# Patient Record
Sex: MALE | Race: WHITE | NOT HISPANIC OR LATINO | URBAN - METROPOLITAN AREA
[De-identification: names, ages, dates, MRNs, and addresses within clinical notes are randomized per-mention and may not be internally consistent; named-entity substitution may affect disease eponyms.]

---

## 2017-05-04 ENCOUNTER — EMERGENCY (EMERGENCY)
Facility: HOSPITAL | Age: 72
LOS: 1 days | Discharge: HOME | End: 2017-05-04
Attending: EMERGENCY MEDICINE | Admitting: EMERGENCY MEDICINE
Payer: MEDICARE

## 2017-05-04 VITALS
RESPIRATION RATE: 20 BRPM | HEART RATE: 87 BPM | DIASTOLIC BLOOD PRESSURE: 68 MMHG | SYSTOLIC BLOOD PRESSURE: 123 MMHG | OXYGEN SATURATION: 98 %

## 2017-05-04 VITALS
WEIGHT: 197.98 LBS | HEIGHT: 74 IN | RESPIRATION RATE: 20 BRPM | HEART RATE: 93 BPM | TEMPERATURE: 98 F | DIASTOLIC BLOOD PRESSURE: 80 MMHG | OXYGEN SATURATION: 98 % | SYSTOLIC BLOOD PRESSURE: 136 MMHG

## 2017-05-04 DIAGNOSIS — R06.02 SHORTNESS OF BREATH: ICD-10-CM

## 2017-05-04 DIAGNOSIS — Z88.8 ALLERGY STATUS TO OTHER DRUGS, MEDICAMENTS AND BIOLOGICAL SUBSTANCES STATUS: ICD-10-CM

## 2017-05-04 DIAGNOSIS — R91.8 OTHER NONSPECIFIC ABNORMAL FINDING OF LUNG FIELD: ICD-10-CM

## 2017-05-04 DIAGNOSIS — Z79.899 OTHER LONG TERM (CURRENT) DRUG THERAPY: ICD-10-CM

## 2017-05-04 DIAGNOSIS — I48.92 UNSPECIFIED ATRIAL FLUTTER: ICD-10-CM

## 2017-05-04 DIAGNOSIS — Z88.0 ALLERGY STATUS TO PENICILLIN: ICD-10-CM

## 2017-05-04 LAB
ANION GAP SERPL CALC-SCNC: 15 MMOL/L — SIGNIFICANT CHANGE UP (ref 5–17)
ANISOCYTOSIS BLD QL: SLIGHT — SIGNIFICANT CHANGE UP
APTT BLD: 29.3 SEC — SIGNIFICANT CHANGE UP (ref 27.5–37.4)
BASOPHILS # BLD AUTO: 0 K/UL — SIGNIFICANT CHANGE UP (ref 0–0.2)
BASOPHILS NFR BLD AUTO: 2 % — SIGNIFICANT CHANGE UP (ref 0–2)
BUN SERPL-MCNC: 20 MG/DL — SIGNIFICANT CHANGE UP (ref 8–20)
CALCIUM SERPL-MCNC: 8.8 MG/DL — SIGNIFICANT CHANGE UP (ref 8.6–10.2)
CHLORIDE SERPL-SCNC: 102 MMOL/L — SIGNIFICANT CHANGE UP (ref 98–107)
CO2 SERPL-SCNC: 21 MMOL/L — LOW (ref 22–29)
CREAT SERPL-MCNC: 0.73 MG/DL — SIGNIFICANT CHANGE UP (ref 0.5–1.3)
EOSINOPHIL # BLD AUTO: 0 K/UL — SIGNIFICANT CHANGE UP (ref 0–0.5)
EOSINOPHIL NFR BLD AUTO: 0 % — SIGNIFICANT CHANGE UP (ref 0–5)
GLUCOSE SERPL-MCNC: 87 MG/DL — SIGNIFICANT CHANGE UP (ref 70–115)
HCT VFR BLD CALC: 29.5 % — LOW (ref 42–52)
HGB BLD-MCNC: 10 G/DL — LOW (ref 14–18)
INR BLD: 1.35 RATIO — HIGH (ref 0.88–1.16)
LYMPHOCYTES # BLD AUTO: 0 % — LOW (ref 20–55)
LYMPHOCYTES # BLD AUTO: 0.4 K/UL — LOW (ref 1–4.8)
MACROCYTES BLD QL: SLIGHT — SIGNIFICANT CHANGE UP
MCHC RBC-ENTMCNC: 33.9 G/DL — SIGNIFICANT CHANGE UP (ref 32–36)
MCHC RBC-ENTMCNC: 36.6 PG — HIGH (ref 27–31)
MCV RBC AUTO: 108.1 FL — HIGH (ref 80–94)
METAMYELOCYTES # FLD: 1 % — HIGH (ref 0–0)
MONOCYTES # BLD AUTO: 0.6 K/UL — SIGNIFICANT CHANGE UP (ref 0–0.8)
MONOCYTES NFR BLD AUTO: 20 % — HIGH (ref 3–10)
NEUTROPHILS # BLD AUTO: 2.8 K/UL — SIGNIFICANT CHANGE UP (ref 1.8–8)
NEUTROPHILS NFR BLD AUTO: 71 % — SIGNIFICANT CHANGE UP (ref 37–73)
NEUTS BAND # BLD: 5 % — SIGNIFICANT CHANGE UP (ref 0–8)
NT-PROBNP SERPL-SCNC: 1844 PG/ML — HIGH (ref 0–300)
PLAT MORPH BLD: NORMAL — SIGNIFICANT CHANGE UP
PLATELET # BLD AUTO: 226 K/UL — SIGNIFICANT CHANGE UP (ref 150–400)
PLATELET CLUMP BLD QL SMEAR: SIGNIFICANT CHANGE UP
PLATELET COUNT - ESTIMATE: NORMAL — SIGNIFICANT CHANGE UP
POTASSIUM SERPL-MCNC: 4.5 MMOL/L — SIGNIFICANT CHANGE UP (ref 3.5–5.3)
POTASSIUM SERPL-SCNC: 4.5 MMOL/L — SIGNIFICANT CHANGE UP (ref 3.5–5.3)
PROTHROM AB SERPL-ACNC: 14.9 SEC — HIGH (ref 9.8–12.7)
RBC # BLD: 2.73 M/UL — LOW (ref 4.6–6.2)
RBC # FLD: 15.5 % — SIGNIFICANT CHANGE UP (ref 11–15.6)
RBC BLD AUTO: ABNORMAL
SODIUM SERPL-SCNC: 138 MMOL/L — SIGNIFICANT CHANGE UP (ref 135–145)
TROPONIN T SERPL-MCNC: <0.01 NG/ML — SIGNIFICANT CHANGE UP (ref 0–0.06)
VARIANT LYMPHS # BLD: 1 % — SIGNIFICANT CHANGE UP (ref 0–6)
WBC # BLD: 3.8 K/UL — LOW (ref 4.8–10.8)
WBC # FLD AUTO: 3.8 K/UL — LOW (ref 4.8–10.8)

## 2017-05-04 PROCEDURE — 84484 ASSAY OF TROPONIN QUANT: CPT

## 2017-05-04 PROCEDURE — 83880 ASSAY OF NATRIURETIC PEPTIDE: CPT

## 2017-05-04 PROCEDURE — 71275 CT ANGIOGRAPHY CHEST: CPT

## 2017-05-04 PROCEDURE — 85027 COMPLETE CBC AUTOMATED: CPT

## 2017-05-04 PROCEDURE — 93010 ELECTROCARDIOGRAM REPORT: CPT

## 2017-05-04 PROCEDURE — 71275 CT ANGIOGRAPHY CHEST: CPT | Mod: 26

## 2017-05-04 PROCEDURE — 71010: CPT | Mod: 26

## 2017-05-04 PROCEDURE — 71045 X-RAY EXAM CHEST 1 VIEW: CPT

## 2017-05-04 PROCEDURE — 93005 ELECTROCARDIOGRAM TRACING: CPT

## 2017-05-04 PROCEDURE — 85610 PROTHROMBIN TIME: CPT

## 2017-05-04 PROCEDURE — 99284 EMERGENCY DEPT VISIT MOD MDM: CPT

## 2017-05-04 PROCEDURE — 99284 EMERGENCY DEPT VISIT MOD MDM: CPT | Mod: 25

## 2017-05-04 PROCEDURE — 85730 THROMBOPLASTIN TIME PARTIAL: CPT

## 2017-05-04 PROCEDURE — 80048 BASIC METABOLIC PNL TOTAL CA: CPT

## 2017-05-04 RX ORDER — AZITHROMYCIN 500 MG/1
1 TABLET, FILM COATED ORAL
Qty: 5 | Refills: 0 | OUTPATIENT
Start: 2017-05-04 | End: 2017-05-09

## 2017-05-04 RX ORDER — AZITHROMYCIN 500 MG/1
500 TABLET, FILM COATED ORAL ONCE
Qty: 0 | Refills: 0 | Status: COMPLETED | OUTPATIENT
Start: 2017-05-04 | End: 2017-05-04

## 2017-05-04 RX ADMIN — AZITHROMYCIN 500 MILLIGRAM(S): 500 TABLET, FILM COATED ORAL at 21:49

## 2017-05-04 NOTE — ED ADULT NURSE REASSESSMENT NOTE - NS ED NURSE REASSESS COMMENT FT1
pt resting comfortably, resp even unlabored. pending CT.
resp even unlabored, pending offical CT results. will continue to monitor

## 2017-05-04 NOTE — ED ADULT NURSE NOTE - OBJECTIVE STATEMENT
received pt AOx3 c/o SOB x 1 week, worse over the past couple days, has B cell lymphoma and being treated with chemo, last dose was 2 weeks ago. resp even unlabored, does not wear 02 at home but states he is more SOB when walking around. denies fevers, chills, hemoptysis. pt is on Xarelto for a flutter. denies cp. MAEx4, neuro intact will continue to monitor

## 2017-05-06 NOTE — ED PROVIDER NOTE - MEDICAL DECISION MAKING DETAILS
abx in nad DDX considered: stable angina, aortic dissection, pericarditis, pneumonia, pe, pts, chest wall pain, esophageal spasm and abdominal emergencies. I have discussed with patient with negative troponin and normal ekg their 28 day cardiac risk and they have agreed to outpt cardio f/ut his week without fail. asa per day until then. return to the ed immediately for any intractable chest pain or sob. pt agrees to plan of care   spoke with dr self agrees with plan of care f.u [facility] within 48 hrs without fail   return to ed for intractbale chest pain or sob pt agrees to plan of care

## 2017-05-06 NOTE — ED PROVIDER NOTE - OBJECTIVE STATEMENT
pt presents with vidal and non productive cough. on eliquis aflutter. no hemoptysis no leg swellign. denies fever. denies HA or neck pain. no chest pain + sob. no abd pain. no n/v/d. no urinary f/u/d. no back pain. no motor or sensory deficits. denies drug use. no recent travel. no rash. no other acute issues symptoms or concerns

## 2017-05-06 NOTE — ED PROVIDER NOTE - CARE PLAN
Principal Discharge DX:	Dyspnea  Secondary Diagnosis:	Atrial flutter  Secondary Diagnosis:	Pulmonary infiltrate

## 2017-08-22 ENCOUNTER — EMERGENCY (EMERGENCY)
Facility: HOSPITAL | Age: 72
LOS: 1 days | Discharge: HOME | End: 2017-08-22
Attending: EMERGENCY MEDICINE
Payer: MEDICARE

## 2017-08-22 VITALS
DIASTOLIC BLOOD PRESSURE: 74 MMHG | SYSTOLIC BLOOD PRESSURE: 115 MMHG | TEMPERATURE: 98 F | OXYGEN SATURATION: 97 % | RESPIRATION RATE: 16 BRPM

## 2017-08-22 VITALS — HEIGHT: 74 IN | WEIGHT: 203.05 LBS

## 2017-08-22 LAB
ALBUMIN SERPL ELPH-MCNC: 3.7 G/DL — SIGNIFICANT CHANGE UP (ref 3.3–5.2)
ALP SERPL-CCNC: 105 U/L — SIGNIFICANT CHANGE UP (ref 40–120)
ALT FLD-CCNC: 25 U/L — SIGNIFICANT CHANGE UP
ANION GAP SERPL CALC-SCNC: 14 MMOL/L — SIGNIFICANT CHANGE UP (ref 5–17)
ANISOCYTOSIS BLD QL: SLIGHT — SIGNIFICANT CHANGE UP
AST SERPL-CCNC: 22 U/L — SIGNIFICANT CHANGE UP
BASOPHILS # BLD AUTO: 0 K/UL — SIGNIFICANT CHANGE UP (ref 0–0.2)
BASOPHILS NFR BLD AUTO: 0 % — SIGNIFICANT CHANGE UP (ref 0–2)
BILIRUB SERPL-MCNC: 0.6 MG/DL — SIGNIFICANT CHANGE UP (ref 0.4–2)
BUN SERPL-MCNC: 15 MG/DL — SIGNIFICANT CHANGE UP (ref 8–20)
CALCIUM SERPL-MCNC: 8.9 MG/DL — SIGNIFICANT CHANGE UP (ref 8.6–10.2)
CHLORIDE SERPL-SCNC: 99 MMOL/L — SIGNIFICANT CHANGE UP (ref 98–107)
CO2 SERPL-SCNC: 24 MMOL/L — SIGNIFICANT CHANGE UP (ref 22–29)
CREAT SERPL-MCNC: 0.83 MG/DL — SIGNIFICANT CHANGE UP (ref 0.5–1.3)
EOSINOPHIL # BLD AUTO: 0 K/UL — SIGNIFICANT CHANGE UP (ref 0–0.5)
EOSINOPHIL NFR BLD AUTO: 0 % — SIGNIFICANT CHANGE UP (ref 0–5)
GLUCOSE SERPL-MCNC: 114 MG/DL — SIGNIFICANT CHANGE UP (ref 70–115)
HCT VFR BLD CALC: 35.3 % — LOW (ref 42–52)
HGB BLD-MCNC: 11.5 G/DL — LOW (ref 14–18)
LACTATE BLDV-MCNC: 1.1 MMOL/L — SIGNIFICANT CHANGE UP (ref 0.5–2)
LYMPHOCYTES # BLD AUTO: 0.4 K/UL — LOW (ref 1–4.8)
LYMPHOCYTES # BLD AUTO: 2 % — LOW (ref 20–55)
MCHC RBC-ENTMCNC: 32.6 G/DL — SIGNIFICANT CHANGE UP (ref 32–36)
MCHC RBC-ENTMCNC: 34.3 PG — HIGH (ref 27–31)
MCV RBC AUTO: 105.4 FL — HIGH (ref 80–94)
METAMYELOCYTES # FLD: 3 % — HIGH (ref 0–0)
MONOCYTES # BLD AUTO: 0.6 K/UL — SIGNIFICANT CHANGE UP (ref 0–0.8)
MONOCYTES NFR BLD AUTO: 14 % — HIGH (ref 3–10)
MYELOCYTES NFR BLD: 1 % — HIGH (ref 0–0)
NEUTROPHILS # BLD AUTO: 6.3 K/UL — SIGNIFICANT CHANGE UP (ref 1.8–8)
NEUTROPHILS NFR BLD AUTO: 72 % — SIGNIFICANT CHANGE UP (ref 37–73)
NEUTS BAND # BLD: 6 % — SIGNIFICANT CHANGE UP (ref 0–8)
PLAT MORPH BLD: NORMAL — SIGNIFICANT CHANGE UP
PLATELET # BLD AUTO: 139 K/UL — LOW (ref 150–400)
POTASSIUM SERPL-MCNC: 4.6 MMOL/L — SIGNIFICANT CHANGE UP (ref 3.5–5.3)
POTASSIUM SERPL-SCNC: 4.6 MMOL/L — SIGNIFICANT CHANGE UP (ref 3.5–5.3)
PROT SERPL-MCNC: 6.3 G/DL — LOW (ref 6.6–8.7)
RBC # BLD: 3.35 M/UL — LOW (ref 4.6–6.2)
RBC # FLD: 14.5 % — SIGNIFICANT CHANGE UP (ref 11–15.6)
RBC BLD AUTO: SIGNIFICANT CHANGE UP
SODIUM SERPL-SCNC: 137 MMOL/L — SIGNIFICANT CHANGE UP (ref 135–145)
VARIANT LYMPHS # BLD: 2 % — SIGNIFICANT CHANGE UP (ref 0–6)
WBC # BLD: 8.3 K/UL — SIGNIFICANT CHANGE UP (ref 4.8–10.8)
WBC # FLD AUTO: 8.3 K/UL — SIGNIFICANT CHANGE UP (ref 4.8–10.8)

## 2017-08-22 PROCEDURE — 80053 COMPREHEN METABOLIC PANEL: CPT

## 2017-08-22 PROCEDURE — 99284 EMERGENCY DEPT VISIT MOD MDM: CPT | Mod: 25

## 2017-08-22 PROCEDURE — 36415 COLL VENOUS BLD VENIPUNCTURE: CPT

## 2017-08-22 PROCEDURE — 85027 COMPLETE CBC AUTOMATED: CPT

## 2017-08-22 PROCEDURE — 93971 EXTREMITY STUDY: CPT

## 2017-08-22 PROCEDURE — 10061 I&D ABSCESS COMP/MULTIPLE: CPT

## 2017-08-22 PROCEDURE — 73080 X-RAY EXAM OF ELBOW: CPT

## 2017-08-22 PROCEDURE — 87040 BLOOD CULTURE FOR BACTERIA: CPT

## 2017-08-22 PROCEDURE — 83605 ASSAY OF LACTIC ACID: CPT

## 2017-08-22 PROCEDURE — 93971 EXTREMITY STUDY: CPT | Mod: 26,LT

## 2017-08-22 PROCEDURE — 96374 THER/PROPH/DIAG INJ IV PUSH: CPT | Mod: XU

## 2017-08-22 PROCEDURE — 90471 IMMUNIZATION ADMIN: CPT

## 2017-08-22 PROCEDURE — 90715 TDAP VACCINE 7 YRS/> IM: CPT

## 2017-08-22 PROCEDURE — 73080 X-RAY EXAM OF ELBOW: CPT | Mod: 26,LT

## 2017-08-22 RX ORDER — CEFTRIAXONE 500 MG/1
1 INJECTION, POWDER, FOR SOLUTION INTRAMUSCULAR; INTRAVENOUS ONCE
Qty: 0 | Refills: 0 | Status: COMPLETED | OUTPATIENT
Start: 2017-08-22 | End: 2017-08-22

## 2017-08-22 RX ORDER — LIDOCAINE HCL 20 MG/ML
5 VIAL (ML) INJECTION ONCE
Qty: 0 | Refills: 0 | Status: COMPLETED | OUTPATIENT
Start: 2017-08-22 | End: 2017-08-22

## 2017-08-22 RX ORDER — TETANUS TOXOID, REDUCED DIPHTHERIA TOXOID AND ACELLULAR PERTUSSIS VACCINE, ADSORBED 5; 2.5; 8; 8; 2.5 [IU]/.5ML; [IU]/.5ML; UG/.5ML; UG/.5ML; UG/.5ML
0.5 SUSPENSION INTRAMUSCULAR ONCE
Qty: 0 | Refills: 0 | Status: COMPLETED | OUTPATIENT
Start: 2017-08-22 | End: 2017-08-22

## 2017-08-22 RX ADMIN — TETANUS TOXOID, REDUCED DIPHTHERIA TOXOID AND ACELLULAR PERTUSSIS VACCINE, ADSORBED 0.5 MILLILITER(S): 5; 2.5; 8; 8; 2.5 SUSPENSION INTRAMUSCULAR at 17:59

## 2017-08-22 RX ADMIN — Medication 5 MILLILITER(S): at 16:05

## 2017-08-22 RX ADMIN — CEFTRIAXONE 100 GRAM(S): 500 INJECTION, POWDER, FOR SOLUTION INTRAMUSCULAR; INTRAVENOUS at 16:05

## 2017-08-22 NOTE — ED PROVIDER NOTE - PROGRESS NOTE DETAILS
after I and D pt given iv rocephin and wbc nl he says it feekls much better and he will f/u with pmd or return here in 2 days for wound check and remove packing

## 2017-08-22 NOTE — ED STATDOCS - PROGRESS NOTE DETAILS
73 y/o M pt  with hx of lymphoma (on imbruvica), HTN, loop recorder ,cardiac ablation ( 2months ago) presents to ED c/o left arm/elbow erythema, swelling  for 9 days. Pt started martín ing Augmentin, symptoms worsening. Was seen by doctor and given an injection of penicillin. He states abscess and arm isn't getting better but not getting worse.     PE: Marked swelling left forearm, pitting edema left hand, induration and swelling medial aspect left elbow, fluctuant swelling over olecranon process, axillary lymphadenopathy

## 2017-08-22 NOTE — ED PROVIDER NOTE - OBJECTIVE STATEMENT
71 y/o male states he has a h/o lymphoma and is on Imbruvica and he developed an abscess on his left elbow and 73 y/o male states he has a h/o lymphoma and is on Imbruvica and he was well until about 2 weeks ago developed an abscess on his left elbow and he saw his PMD and treated with a shot of penicillin and then 4 days ago he was put on Augmentin 875 bid and he has been taking it but he noted arm swelling so came to ed 71 y/o male states he has a h/o lymphoma and is on Imbruvica and he was well until about 2 weeks ago developed an abscess on his left elbow and he saw his PMD and treated with a shot of penicillin and then 4 days ago he was put on Augmentin 875 bid and he has been taking it but he noted arm swelling so came to ed TDap not up to date and R and B discussed and pt agrees to it

## 2017-08-22 NOTE — ED PROVIDER NOTE - SKIN, MLM
Skin normal color for race, warm, dry and intact. No evidence of rash.abscess proximal forearm with surrounding edema and faint patially treated cellultis up to elbow proximal arm not involved and no lymphangitis axilla nontender no axillary lymadenopathy

## 2017-08-27 LAB
CULTURE RESULTS: SIGNIFICANT CHANGE UP
CULTURE RESULTS: SIGNIFICANT CHANGE UP
SPECIMEN SOURCE: SIGNIFICANT CHANGE UP
SPECIMEN SOURCE: SIGNIFICANT CHANGE UP

## 2018-03-06 ENCOUNTER — RX RENEWAL (OUTPATIENT)
Age: 73
End: 2018-03-06

## 2018-03-12 ENCOUNTER — APPOINTMENT (OUTPATIENT)
Dept: NEUROLOGY | Facility: CLINIC | Age: 73
End: 2018-03-12
Payer: MEDICARE

## 2018-03-12 VITALS
HEIGHT: 69 IN | DIASTOLIC BLOOD PRESSURE: 72 MMHG | BODY MASS INDEX: 22.22 KG/M2 | SYSTOLIC BLOOD PRESSURE: 130 MMHG | WEIGHT: 150 LBS

## 2018-03-12 DIAGNOSIS — G62.0 DRUG-INDUCED POLYNEUROPATHY: ICD-10-CM

## 2018-03-12 DIAGNOSIS — T45.1X5A DRUG-INDUCED POLYNEUROPATHY: ICD-10-CM

## 2018-03-12 DIAGNOSIS — Z86.79 PERSONAL HISTORY OF OTHER DISEASES OF THE CIRCULATORY SYSTEM: ICD-10-CM

## 2018-03-12 DIAGNOSIS — Z78.9 OTHER SPECIFIED HEALTH STATUS: ICD-10-CM

## 2018-03-12 DIAGNOSIS — C95.91 LEUKEMIA, UNSPECIFIED, IN REMISSION: ICD-10-CM

## 2018-03-12 DIAGNOSIS — Z87.891 PERSONAL HISTORY OF NICOTINE DEPENDENCE: ICD-10-CM

## 2018-03-12 PROCEDURE — 99213 OFFICE O/P EST LOW 20 MIN: CPT

## 2018-04-06 ENCOUNTER — TRANSCRIPTION ENCOUNTER (OUTPATIENT)
Age: 73
End: 2018-04-06

## 2018-08-27 ENCOUNTER — RX RENEWAL (OUTPATIENT)
Age: 73
End: 2018-08-27

## 2018-08-27 RX ORDER — GABAPENTIN 300 MG/1
300 CAPSULE ORAL 3 TIMES DAILY
Qty: 90 | Refills: 5 | Status: ACTIVE | COMMUNITY
Start: 2018-03-12 | End: 1900-01-01

## 2019-02-28 ENCOUNTER — EMERGENCY (EMERGENCY)
Facility: HOSPITAL | Age: 74
LOS: 1 days | Discharge: HOME | End: 2019-02-28
Attending: EMERGENCY MEDICINE
Payer: MEDICARE

## 2019-02-28 VITALS
HEART RATE: 98 BPM | OXYGEN SATURATION: 99 % | SYSTOLIC BLOOD PRESSURE: 138 MMHG | DIASTOLIC BLOOD PRESSURE: 87 MMHG | TEMPERATURE: 98 F | RESPIRATION RATE: 20 BRPM

## 2019-02-28 VITALS — HEIGHT: 73 IN | WEIGHT: 195.11 LBS

## 2019-02-28 LAB
ALBUMIN SERPL ELPH-MCNC: 4.1 G/DL — SIGNIFICANT CHANGE UP (ref 3.3–5.2)
ALP SERPL-CCNC: 79 U/L — SIGNIFICANT CHANGE UP (ref 40–120)
ALT FLD-CCNC: 7 U/L — SIGNIFICANT CHANGE UP
ANION GAP SERPL CALC-SCNC: 11 MMOL/L — SIGNIFICANT CHANGE UP (ref 5–17)
APTT BLD: 30.7 SEC — SIGNIFICANT CHANGE UP (ref 27.5–36.3)
AST SERPL-CCNC: 16 U/L — SIGNIFICANT CHANGE UP
BASOPHILS # BLD AUTO: 0.1 K/UL — SIGNIFICANT CHANGE UP (ref 0–0.2)
BASOPHILS NFR BLD AUTO: 1 % — SIGNIFICANT CHANGE UP (ref 0–2)
BILIRUB SERPL-MCNC: 0.7 MG/DL — SIGNIFICANT CHANGE UP (ref 0.4–2)
BUN SERPL-MCNC: 23 MG/DL — HIGH (ref 8–20)
CALCIUM SERPL-MCNC: 8.4 MG/DL — LOW (ref 8.6–10.2)
CHLORIDE SERPL-SCNC: 105 MMOL/L — SIGNIFICANT CHANGE UP (ref 98–107)
CK SERPL-CCNC: 50 U/L — SIGNIFICANT CHANGE UP (ref 30–200)
CO2 SERPL-SCNC: 25 MMOL/L — SIGNIFICANT CHANGE UP (ref 22–29)
CREAT SERPL-MCNC: 1.04 MG/DL — SIGNIFICANT CHANGE UP (ref 0.5–1.3)
EOSINOPHIL # BLD AUTO: 0 K/UL — SIGNIFICANT CHANGE UP (ref 0–0.5)
GLUCOSE SERPL-MCNC: 122 MG/DL — HIGH (ref 70–115)
HCT VFR BLD CALC: 31.2 % — LOW (ref 42–52)
HGB BLD-MCNC: 10.4 G/DL — LOW (ref 14–18)
HYPOCHROMIA BLD QL: SLIGHT — SIGNIFICANT CHANGE UP
INR BLD: 1.39 RATIO — HIGH (ref 0.88–1.16)
LYMPHOCYTES # BLD AUTO: 0.7 K/UL — LOW (ref 1–4.8)
LYMPHOCYTES # BLD AUTO: 12 % — LOW (ref 20–55)
MACROCYTES BLD QL: SLIGHT — SIGNIFICANT CHANGE UP
MCHC RBC-ENTMCNC: 33.3 G/DL — SIGNIFICANT CHANGE UP (ref 32–36)
MCHC RBC-ENTMCNC: 36.9 PG — HIGH (ref 27–31)
MCV RBC AUTO: 110.6 FL — HIGH (ref 80–94)
MONOCYTES # BLD AUTO: 0.6 K/UL — SIGNIFICANT CHANGE UP (ref 0–0.8)
MONOCYTES NFR BLD AUTO: 11 % — HIGH (ref 3–10)
MYELOCYTES NFR BLD: 2 % — HIGH (ref 0–0)
NEUTROPHILS # BLD AUTO: 4.3 K/UL — SIGNIFICANT CHANGE UP (ref 1.8–8)
NEUTROPHILS NFR BLD AUTO: 68 % — SIGNIFICANT CHANGE UP (ref 37–73)
NEUTS BAND # BLD: 6 % — SIGNIFICANT CHANGE UP (ref 0–8)
NT-PROBNP SERPL-SCNC: 190 PG/ML — SIGNIFICANT CHANGE UP (ref 0–300)
PLAT MORPH BLD: NORMAL — SIGNIFICANT CHANGE UP
PLATELET # BLD AUTO: 131 K/UL — LOW (ref 150–400)
POTASSIUM SERPL-MCNC: 4.7 MMOL/L — SIGNIFICANT CHANGE UP (ref 3.5–5.3)
POTASSIUM SERPL-SCNC: 4.7 MMOL/L — SIGNIFICANT CHANGE UP (ref 3.5–5.3)
PROT SERPL-MCNC: 6.1 G/DL — LOW (ref 6.6–8.7)
PROTHROM AB SERPL-ACNC: 16.1 SEC — HIGH (ref 10–12.9)
RBC # BLD: 2.82 M/UL — LOW (ref 4.6–6.2)
RBC # FLD: 13.8 % — SIGNIFICANT CHANGE UP (ref 11–15.6)
RBC BLD AUTO: ABNORMAL
SODIUM SERPL-SCNC: 141 MMOL/L — SIGNIFICANT CHANGE UP (ref 135–145)
TROPONIN T SERPL-MCNC: <0.01 NG/ML — SIGNIFICANT CHANGE UP (ref 0–0.06)
WBC # BLD: 5.7 K/UL — SIGNIFICANT CHANGE UP (ref 4.8–10.8)
WBC # FLD AUTO: 5.7 K/UL — SIGNIFICANT CHANGE UP (ref 4.8–10.8)

## 2019-02-28 PROCEDURE — 85730 THROMBOPLASTIN TIME PARTIAL: CPT

## 2019-02-28 PROCEDURE — 36415 COLL VENOUS BLD VENIPUNCTURE: CPT

## 2019-02-28 PROCEDURE — 85610 PROTHROMBIN TIME: CPT

## 2019-02-28 PROCEDURE — 83880 ASSAY OF NATRIURETIC PEPTIDE: CPT

## 2019-02-28 PROCEDURE — 71045 X-RAY EXAM CHEST 1 VIEW: CPT

## 2019-02-28 PROCEDURE — 82550 ASSAY OF CK (CPK): CPT

## 2019-02-28 PROCEDURE — 99284 EMERGENCY DEPT VISIT MOD MDM: CPT

## 2019-02-28 PROCEDURE — 99284 EMERGENCY DEPT VISIT MOD MDM: CPT | Mod: 25

## 2019-02-28 PROCEDURE — 71045 X-RAY EXAM CHEST 1 VIEW: CPT | Mod: 26

## 2019-02-28 PROCEDURE — 73702 CT LWR EXTREMITY W/O&W/DYE: CPT

## 2019-02-28 PROCEDURE — 71275 CT ANGIOGRAPHY CHEST: CPT | Mod: 26

## 2019-02-28 PROCEDURE — 93005 ELECTROCARDIOGRAM TRACING: CPT

## 2019-02-28 PROCEDURE — 93971 EXTREMITY STUDY: CPT

## 2019-02-28 PROCEDURE — 80053 COMPREHEN METABOLIC PANEL: CPT

## 2019-02-28 PROCEDURE — 85027 COMPLETE CBC AUTOMATED: CPT

## 2019-02-28 PROCEDURE — 73702 CT LWR EXTREMITY W/O&W/DYE: CPT | Mod: 26,LT

## 2019-02-28 PROCEDURE — 93971 EXTREMITY STUDY: CPT | Mod: 26,LT

## 2019-02-28 PROCEDURE — 84484 ASSAY OF TROPONIN QUANT: CPT

## 2019-02-28 PROCEDURE — 93010 ELECTROCARDIOGRAM REPORT: CPT

## 2019-02-28 PROCEDURE — 71275 CT ANGIOGRAPHY CHEST: CPT

## 2019-02-28 NOTE — ED ADULT NURSE NOTE - NSIMPLEMENTINTERV_GEN_ALL_ED
Implemented All Fall with Harm Risk Interventions:  [place] to call system. Call bell, personal items and telephone within reach. Instruct patient to call for assistance. Room bathroom lighting operational. Non-slip footwear when patient is off stretcher. Physically safe environment: no spills, clutter or unnecessary equipment. Stretcher in lowest position, wheels locked, appropriate side rails in place. Provide visual cue, wrist band, yellow gown, etc. Monitor gait and stability. Monitor for mental status changes and reorient to person, place, and time. Review medications for side effects contributing to fall risk. Reinforce activity limits and safety measures with patient and family. Provide visual clues: red socks.

## 2019-02-28 NOTE — ED PROVIDER NOTE - PROGRESS NOTE DETAILS
PT. with NO PE. Pt. with hematoma to left lower leg. PT. latter stated that he may have bumped his leg going in and coming out of his car. PT. has ecchymosis/bruising(from blood pooling) to his left ankle/foot. CT scan report discussed with patient and pt. is aware of the lesions to his spine. Pt. needs follow up with his PMD and a bone scan. This was explained to the patient and his wife.

## 2019-02-28 NOTE — ED ADULT NURSE NOTE - OBJECTIVE STATEMENT
73 yom presents to ed complaining of pain/ discoloration of left lower extremity. pt reports dyspnea on exertion and pain with ambulation airway intact lung sounds clear equal bilaterally abd soft nontender nondistended moves all extremities ambulates with assistance. patient has + distal pulses. pt has discoloration and bruising of many stages secondary to venipuncture.

## 2019-02-28 NOTE — ED ADULT TRIAGE NOTE - CHIEF COMPLAINT QUOTE
Pt ambulatory in ED c/o swelling and pain to left calf, pt takes eliquis. Reports also reports associated short of breath on exertion. Denies any chest pain or back pain. Sent to r/o DVT and PE. Yvon - Malcolm

## 2019-02-28 NOTE — ED PROVIDER NOTE - OBJECTIVE STATEMENT
PT. present to ED PT. present to ED c/o sudden onset of left lower leg swelling that he noticed yesterday. No trauma. Pt. has hx of A-fib/A-flutter on Eliquis. Pt. today c/o SOB and MIN and went to see his cardiologist. Pt. denies any chest pain. No palpitations. Pt. sent to the ED to r/o DVT vs PE. PT. at rest denies any pain.

## 2019-02-28 NOTE — ED STATDOCS - PROGRESS NOTE DETAILS
Patient is a 72 y/o male with a pmhx of leukemia, pacemaker, sent in by cardiologist, dr. aden sent in for swelling to the left calf that started yesterday when he woke up. Patient stating that he has been experiencing SOB since it started. Pt currently on eliquis, will send to [facility] for further evaluation

## 2019-03-19 ENCOUNTER — APPOINTMENT (OUTPATIENT)
Dept: NEUROLOGY | Facility: CLINIC | Age: 74
End: 2019-03-19

## 2019-04-01 ENCOUNTER — INPATIENT (INPATIENT)
Facility: HOSPITAL | Age: 74
LOS: 0 days | Discharge: HOME | DRG: 291 | End: 2019-04-02
Attending: INTERNAL MEDICINE | Admitting: STUDENT IN AN ORGANIZED HEALTH CARE EDUCATION/TRAINING PROGRAM
Payer: MEDICARE

## 2019-04-01 VITALS
WEIGHT: 197.09 LBS | HEIGHT: 73 IN | HEART RATE: 72 BPM | DIASTOLIC BLOOD PRESSURE: 79 MMHG | OXYGEN SATURATION: 97 % | SYSTOLIC BLOOD PRESSURE: 167 MMHG | TEMPERATURE: 98 F | RESPIRATION RATE: 20 BRPM

## 2019-04-01 DIAGNOSIS — I50.9 HEART FAILURE, UNSPECIFIED: ICD-10-CM

## 2019-04-01 PROBLEM — Z95.0 PRESENCE OF CARDIAC PACEMAKER: Chronic | Status: ACTIVE | Noted: 2019-02-28

## 2019-04-01 PROBLEM — I10 ESSENTIAL (PRIMARY) HYPERTENSION: Chronic | Status: ACTIVE | Noted: 2019-02-28

## 2019-04-01 LAB
ALBUMIN SERPL ELPH-MCNC: 3.9 G/DL — SIGNIFICANT CHANGE UP (ref 3.3–5.2)
ALP SERPL-CCNC: 92 U/L — SIGNIFICANT CHANGE UP (ref 40–120)
ALT FLD-CCNC: 12 U/L — SIGNIFICANT CHANGE UP
ANION GAP SERPL CALC-SCNC: 12 MMOL/L — SIGNIFICANT CHANGE UP (ref 5–17)
AST SERPL-CCNC: 18 U/L — SIGNIFICANT CHANGE UP
BILIRUB SERPL-MCNC: 0.8 MG/DL — SIGNIFICANT CHANGE UP (ref 0.4–2)
BUN SERPL-MCNC: 14 MG/DL — SIGNIFICANT CHANGE UP (ref 8–20)
CALCIUM SERPL-MCNC: 8.9 MG/DL — SIGNIFICANT CHANGE UP (ref 8.6–10.2)
CHLORIDE SERPL-SCNC: 104 MMOL/L — SIGNIFICANT CHANGE UP (ref 98–107)
CO2 SERPL-SCNC: 22 MMOL/L — SIGNIFICANT CHANGE UP (ref 22–29)
CREAT SERPL-MCNC: 0.94 MG/DL — SIGNIFICANT CHANGE UP (ref 0.5–1.3)
GLUCOSE SERPL-MCNC: 124 MG/DL — HIGH (ref 70–115)
HCT VFR BLD CALC: 32.2 % — LOW (ref 42–52)
HGB BLD-MCNC: 10.2 G/DL — LOW (ref 14–18)
MCHC RBC-ENTMCNC: 31.7 G/DL — LOW (ref 32–36)
MCHC RBC-ENTMCNC: 36.3 PG — HIGH (ref 27–31)
MCV RBC AUTO: 114.6 FL — HIGH (ref 80–94)
NT-PROBNP SERPL-SCNC: 1002 PG/ML — HIGH (ref 0–300)
PLATELET # BLD AUTO: 162 K/UL — SIGNIFICANT CHANGE UP (ref 150–400)
POTASSIUM SERPL-MCNC: 5.2 MMOL/L — SIGNIFICANT CHANGE UP (ref 3.5–5.3)
POTASSIUM SERPL-SCNC: 5.2 MMOL/L — SIGNIFICANT CHANGE UP (ref 3.5–5.3)
PROT SERPL-MCNC: 5.9 G/DL — LOW (ref 6.6–8.7)
RBC # BLD: 2.81 M/UL — LOW (ref 4.6–6.2)
RBC # FLD: 14.7 % — SIGNIFICANT CHANGE UP (ref 11–15.6)
SODIUM SERPL-SCNC: 138 MMOL/L — SIGNIFICANT CHANGE UP (ref 135–145)
TROPONIN T SERPL-MCNC: <0.01 NG/ML — SIGNIFICANT CHANGE UP (ref 0–0.06)
WBC # BLD: 4.7 K/UL — LOW (ref 4.8–10.8)
WBC # FLD AUTO: 4.7 K/UL — LOW (ref 4.8–10.8)

## 2019-04-01 PROCEDURE — 99223 1ST HOSP IP/OBS HIGH 75: CPT | Mod: GC

## 2019-04-01 PROCEDURE — 71046 X-RAY EXAM CHEST 2 VIEWS: CPT | Mod: 26

## 2019-04-01 PROCEDURE — 71275 CT ANGIOGRAPHY CHEST: CPT | Mod: 26

## 2019-04-01 PROCEDURE — 99285 EMERGENCY DEPT VISIT HI MDM: CPT

## 2019-04-01 PROCEDURE — 93010 ELECTROCARDIOGRAM REPORT: CPT

## 2019-04-01 RX ORDER — FOLIC ACID 0.8 MG
1 TABLET ORAL DAILY
Qty: 0 | Refills: 0 | Status: DISCONTINUED | OUTPATIENT
Start: 2019-04-01 | End: 2019-04-02

## 2019-04-01 RX ORDER — RIVAROXABAN 15 MG-20MG
0 KIT ORAL
Qty: 0 | Refills: 0 | COMMUNITY

## 2019-04-01 RX ORDER — ZOLPIDEM TARTRATE 10 MG/1
5 TABLET ORAL AT BEDTIME
Qty: 0 | Refills: 0 | Status: DISCONTINUED | OUTPATIENT
Start: 2019-04-01 | End: 2019-04-02

## 2019-04-01 RX ORDER — ZOLPIDEM TARTRATE 10 MG/1
0 TABLET ORAL
Qty: 0 | Refills: 0 | COMMUNITY

## 2019-04-01 RX ORDER — GABAPENTIN 400 MG/1
300 CAPSULE ORAL THREE TIMES A DAY
Qty: 0 | Refills: 0 | Status: DISCONTINUED | OUTPATIENT
Start: 2019-04-01 | End: 2019-04-02

## 2019-04-01 RX ORDER — IBRUTINIB 140 MG/1
140 TABLET, FILM COATED ORAL
Qty: 0 | Refills: 0 | Status: DISCONTINUED | OUTPATIENT
Start: 2019-04-01 | End: 2019-04-02

## 2019-04-01 RX ORDER — APIXABAN 2.5 MG/1
5 TABLET, FILM COATED ORAL EVERY 12 HOURS
Qty: 0 | Refills: 0 | Status: DISCONTINUED | OUTPATIENT
Start: 2019-04-01 | End: 2019-04-02

## 2019-04-01 RX ORDER — CARVEDILOL PHOSPHATE 80 MG/1
3.12 CAPSULE, EXTENDED RELEASE ORAL EVERY 12 HOURS
Qty: 0 | Refills: 0 | Status: DISCONTINUED | OUTPATIENT
Start: 2019-04-01 | End: 2019-04-02

## 2019-04-01 RX ORDER — FUROSEMIDE 40 MG
40 TABLET ORAL
Qty: 0 | Refills: 0 | Status: DISCONTINUED | OUTPATIENT
Start: 2019-04-01 | End: 2019-04-02

## 2019-04-01 RX ORDER — SODIUM CHLORIDE 9 MG/ML
3 INJECTION INTRAMUSCULAR; INTRAVENOUS; SUBCUTANEOUS ONCE
Qty: 0 | Refills: 0 | Status: COMPLETED | OUTPATIENT
Start: 2019-04-01 | End: 2019-04-01

## 2019-04-01 RX ORDER — CARVEDILOL PHOSPHATE 80 MG/1
0 CAPSULE, EXTENDED RELEASE ORAL
Qty: 0 | Refills: 0 | COMMUNITY

## 2019-04-01 RX ORDER — GABAPENTIN 400 MG/1
0 CAPSULE ORAL
Qty: 0 | Refills: 0 | COMMUNITY

## 2019-04-01 RX ADMIN — Medication 40 MILLIGRAM(S): at 20:21

## 2019-04-01 RX ADMIN — CARVEDILOL PHOSPHATE 3.12 MILLIGRAM(S): 80 CAPSULE, EXTENDED RELEASE ORAL at 20:22

## 2019-04-01 RX ADMIN — SODIUM CHLORIDE 3 MILLILITER(S): 9 INJECTION INTRAMUSCULAR; INTRAVENOUS; SUBCUTANEOUS at 12:07

## 2019-04-01 RX ADMIN — ZOLPIDEM TARTRATE 5 MILLIGRAM(S): 10 TABLET ORAL at 23:59

## 2019-04-01 RX ADMIN — APIXABAN 5 MILLIGRAM(S): 2.5 TABLET, FILM COATED ORAL at 21:11

## 2019-04-01 NOTE — ED PROVIDER NOTE - PHYSICAL EXAMINATION
Constitutional - well-developed; well nourished. Head - NCAT. Airway patent. Eyes - PERRL. CV - RRR. no murmur. no edema. Pulm - mild bibasilar crackles. Abd - soft, nt. no rebound. no guarding. Neuro - A&Ox3. strength 5/5 x4. sensation intact x4. normal gait. Skin - No rash. MSK - normal ROM.

## 2019-04-01 NOTE — H&P ADULT - NSICDXPASTMEDICALHX_GEN_ALL_CORE_FT
PAST MEDICAL HISTORY:  Atrial fibrillation and flutter     HTN (hypertension)     Pacemaker     Waldenstrom macroglobulinemia

## 2019-04-01 NOTE — H&P ADULT - ASSESSMENT
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     Admit to telemetry, medicine resident service, Dr. Mike  Vitals, activity as tolerated, diet dashtlc/fluid restricted, dvt ppx: on Eliquis    Acute Diastolic CHF exacerbation  -start Lasix 40 mg iv bid   -daily weights, strict i/o  -fluid striction 1L daily  -trop negative x 2, trop x 1 pending  -cardio consult appreciated    Sclerotic bone lesions of thoracic spine concerning for mets  -outpt bone scan recommended  -f/u outpt oncology    Chronic AFib  -c/w home med, Eliquis 5 mg po bid  -c/w home med, Coreg 3.125 mg po bid    Waldenstrom macroglobulinemia   -c/w home med, Imbruvica 140 mg po bid    Macrocytic anemia   -b12, folate, iron studies pending    Leukopenia  likely due to underlying malignancy  -outpt follow up recommended with oncology    Preventive measure  -dvt ppx: Eliquis 5 mg po bid 72 yo male with pmhx of Waldenstrom macroglobulinemia (dx in 2014, currently on Imbruvica),  paroxysmal atrial flutter s/p ablation on Eliquis, 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     Admit to telemetry, medicine resident service, Dr. Mike  Vitals, activity as tolerated, diet dashtlc/fluid restricted, dvt ppx: on Eliquis    Acute Diastolic CHF exacerbation  -start Lasix 40 mg iv bid   -daily weights, strict i/o  -fluid striction 1L daily  -trop negative x 2, trop x 1 pending  -cardio consult appreciated    Sclerotic bone lesions of thoracic spine concerning for mets  -outpt bone scan recommended  -f/u outpt oncology    Chronic AFib  CHADSVASC: 3  -c/w home med, Eliquis 5 mg po bid  -c/w home med, Coreg 3.125 mg po bid    Waldenstrom macroglobulinemia   -c/w home med, Imbruvica 140 mg po bid    Macrocytic anemia   -b12, folate, iron studies pending    Leukopenia  likely due to underlying malignancy  -outpt follow up recommended with oncology    Preventive measure  -dvt ppx: Eliquis 5 mg po bid

## 2019-04-01 NOTE — ED PROVIDER NOTE - NS ED ROS FT
No fever/chills, No photophobia/eye pain/changes in vision, No ear pain/sore throat/dysphagia, No chest pain/palpitations, + SOB no cough/wheeze/stridor, No abdominal pain, No N/V/D, no dysuria/frequency/discharge, No neck/back pain, no rash, no changes in neurological status/function.

## 2019-04-01 NOTE — H&P ADULT - NSHPPHYSICALEXAM_GEN_ALL_CORE
Vital Signs Last 24 Hrs  T(C): 36.8 (01 Apr 2019 23:44), Max: 36.8 (01 Apr 2019 23:44)  T(F): 98.2 (01 Apr 2019 23:44), Max: 98.2 (01 Apr 2019 23:44)  HR: 102 (01 Apr 2019 23:44) (64 - 102)  BP: 154/92 (01 Apr 2019 23:44) (139/68 - 167/79)  RR: 19 (01 Apr 2019 23:44) (18 - 20)  SpO2: 98% (01 Apr 2019 23:44) (91% - 98%)

## 2019-04-01 NOTE — CONSULT NOTE ADULT - SUBJECTIVE AND OBJECTIVE BOX
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 He admits to eating salty food over the past few days but usually doesn't eat salty food. He denies chest pan, palpitations, orthopnea, PND, syncope or worsening leg swelling.     PROBLEM LIST:    PAST MEDICAL HISTORY:  Leukemia  Pacemaker  Atrial fibrillation and flutter  HTN (hypertension)  Lymphoma    PAST SURGICAL HISTORY:  No significant past surgical history    MEDICATIONS  (STANDING):    MEDICATIONS  (PRN):    ALLERGIES:  Rituxan (Hives; Rash)    SOCIAL HISTORY:  Tobacco: 2 PPD x 25 years, quit in   Alcohol: denies  Drugs: denies    FAMILY HISTORY:  Mom - cancer  Dad - COPD  Brother - CABG in early 50s, HTN, hyperlipidemia, alcohol abuse, COPD  	    REVIEW OF SYSTEMS:  Constitutional: no fatigue, no fevers/chills, no weight change  HEENT: no visual disturbances, + hearing loss  Cardiac: no chest pain, no palpitations, no orthopnea, no PND  Respiratory: + MIN, + slight cough productive of white frothy sputum  GI: no abdominal pain, no blood in the stool, no N/V, no diarrhea  Urological: no dysuria, no hematuria, + urinary frequency (chronic)  Hematological: + easy bruising and bleeding  Musculoskeletal: no joint pain, no back pain  Neurological: no headaches, no syncope  Psychiatric: no anxiety, no depression  Skin: no rashes, healing wound on nose s/p Moh's    PHYSICAL EXAM:    Weight (kg): 89.4 ( @ :26)  T(C): 36.6 (19 @ 11:26), Max: 36.6 (19 @ 11:)  T(F): 97.8 (19 @ 11:), Max: 97.8 (19 @ 11:26)  HR: 65 (19 @ 16:46) (65 - 72)  BP: 158/74 (19 @ 16:46) (158/74 - 167/79)  RR: 18 (19 @ 16:46) (18 - 20)  SpO2: 98% (19 @ 16:46) (97% - 98%)  Wt(kg): --  Telemetry: AFib with occasional V-pacing  General: comfortable, in NAD  HEENT: EOMI, normocephalic, atraumatic  Neck: no JVD, no carotid bruits  Heart: +S1S2, irregularly irregular, 1/6 systolic murmur at the LLSB, no rubs, no gallops  Lungs: decreased breath sounds at the bases bilaterally, inspiratory crackles at the bases bilaterally, no wheezes, no rhonchi  Abdomen: soft, non-tender, non-distended, + bowel sounds  Extremities: no clubbing, no cyanosis, 2+ RLE and 1+ LLE tense edema but left leg is larger than right  Vascular: 2+ dorsalis pedis pulses bilaterally  Neuro: A&O x3    EKG: AFib with V-pacing and occasional inhibition, HR 62, RBBB when not paced    LABS:  Serum Pro-Brain Natriuretic Peptide: 1002 pg/mL (19 @ 12:14)    Troponin T, Serum: <0.01 ng/mL (19 @ 12:14)        CBC:            10.2   4.7   >-----------< 162     @ 12:14            32.2         CHEMISTRY:   138   |  104    |  14.0   ----------------------------< 124      @ 12:14    5.2   |  22.0   |  0.94     eGFR non-  81     eGFR   94         TPro --    / Alb 3.9   / TBili 0.8   / DBili --    / AST 18    / ALT 12    / AlkPhos 92      @ 12:14    COAGS:      RADIOLOGY & ADDITIONAL TESTS:  CTA Chest:   No evidence of pulmonary embolus.   Mild cardiomegaly without pericardial effusion.   Mild bronchial wall thickening, likely reactive airways disease or   bronchitis.    Increased bilateral pleural effusions  Increased bilateral lower lobe reticular opacities and groundglass   opacities as described .   New sclerotic lesions in the thoracic spine suspicious for metastatic   disease; a bone scan is recommended for further evaluation.   Sclerotic bone lesions unchanged..  Summation of findings may indicate them a pulmonary edema of cardiac or   noncardiac origin. Superimposed infectious pneumonia should be considered   as well.    CXR:   Left cardiac device again noted.  New small right pleural effusion with atelectasis lung bases.  Heart size within normal limits.    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    Please permit me to suggest the followin.  I suspect that the patient is in acute diastolic heart failure likely secondary to going into AFib and some dietary indiscretion. He was recently started on Imbruvica which is known to cause AFib. I will start Lasix 40 mg IV 2x daily. If he responds well to this, I am hoping that he can be switched to torsemide 10 mg daily tomorrow and discharged home with out-patient follow-up since he is immunosuppressed and both he and I are concerned about hm picking up an infection in the hospital.  2.  Continue Coreg for rate control of AFib. His EF was normal so no need for an ACE or ARB at this time. If it can't get done as an in-patient, I will do an out-patient Echo upon discharge.  3. He has a [id number] score of 3. Continue Eliquis 5 mg 2x daily.

## 2019-04-01 NOTE — H&P ADULT - NEUROLOGICAL DETAILS
sensation intact/cranial nerves intact/alert and oriented x 3/normal strength/responds to verbal commands

## 2019-04-01 NOTE — H&P ADULT - NSHPOUTPATIENTPROVIDERS_GEN_ALL_CORE
PMD: Dr. Carranza ([place])  Cardio: Dr. Fowler  Oncologist: Dr. Couch  Pharm: [facility] [place] ([facility])  Code: full code

## 2019-04-01 NOTE — ED ADULT NURSE NOTE - OBJECTIVE STATEMENT
received pt awake and alert x3, c/o mild SOB x 1-2 weeks at rest with increased SOB upon exertion. pt states hx of Leukemia currently on imbruvica x 1 year, pt noted to have mild edema to BL extremity with increased swelling and stiffness to LLE. pt was sent by cardiologist for evual of Afib. pt has pacemaker with a controlled rate of 65BPM. pt denies Chest pain, orthopnea, diaphoresis, n/v/d, received pt awake and alert x3, c/o mild SOB x 1-2 weeks at rest with increased SOB upon exertion. pt states hx of Leukemia currently on imbruvica x 1 year, pt noted to have mild edema to BL extremity with increased swelling and stiffness to LLE. pt was sent by cardiologist for evual of Afib. pt has pacemaker with a controlled rate of 65BPM. pt denies Chest pain, orthopnea, diaphoresis, n/v/d, resp even unlabored, in no distress, MAEx4, neuro intact, will CTM.

## 2019-04-01 NOTE — H&P ADULT - RS GEN PE MLT RESP DETAILS PC
no wheezes/no intercostal retractions/no rales/breath sounds equal/no rhonchi/airway patent/respirations non-labored/clear to auscultation bilaterally

## 2019-04-01 NOTE — H&P ADULT - HISTORY OF PRESENT ILLNESS
72 yo male with pmhx of Waldenstrom macroglobulinemia (dx in 2014, currently on Imbruvica),  paroxysmal atrial flutter s/p ablation on Eliquis, left atrial appendage thrombus s/p treatment with Xarelto, HTN, Medtronic PM (placed Jan 2019) for intermittent complete heart block presents to ED with complaint of SOB. Pt reports that he began having labored breathing 2 weeks ago. It is worsened by exertion. He reports difficulty going upstairs. Over the past two days it has significantly worsened. He spoke with his cardiologist who advised him to go to ED. Denies orthopnea, PND, fever, chills, CP, palpitations, abd pain, N/V, or calf pain.

## 2019-04-01 NOTE — H&P ADULT - NSHPSOCIALHISTORY_GEN_ALL_CORE
Lives with wife at home. Quit smoking 40 yrs ago, smoked 3 ppd for 20 yrs prior to that. Denies alcohol or illicit drug use. Ambulates without assistance.

## 2019-04-01 NOTE — ED PROVIDER NOTE - OBJECTIVE STATEMENT
Pt is a 72 yo M co shortness of breath. PMhx significant for atrial fibrillation, waldenstrom's macroglobulinemia, ppm, htn. Pt states that for the past 2 weeks he has had worsening shortness of breath especially on exertion. Pt states that he was feeling fine prior to this starting. no cp. no n/v. no diarrhea. no fever/chills. no other complaints.

## 2019-04-01 NOTE — H&P ADULT - GASTROINTESTINAL DETAILS
no masses palpable/bowel sounds normal/no distention/no rigidity/no rebound tenderness/no guarding/soft/nontender

## 2019-04-01 NOTE — CONSULT NOTE ADULT - SUBJECTIVE AND OBJECTIVE BOX
Cardiology HPI 73y old male with a history of paroxysmal atrial flutter s/p ablation, left atrial appendage thrombus s/p treatment with Xarelto, essential hypertension, Medtronic PPM implanted 1/2019 for intermittent complete heart block and recurrent lymphoma which is in his bone marrow on Imbruvica who presents with SOB for the past two weeks that has worsened over the past 2 days. He admits to eating salty food over the past few days but usually doesn't eat salty food. He denies chest pan, palpitations, orthopnea, PND, syncope or worsening leg swelling.    Above HPI reviewed and noted: patient reports significant improvement of dyspnea after IV diuresis, with positive urine out put. case discussed with resident, i agree with cardiology recommendation to discharge patient asap. As patient has improved i agree with echocardiogram being completed as outpt. In view of patient ongoing lymphoma treatment with Imbruvica, recommend cont home medication and following up with oncologist upon discharge tomorrow. Cont AC, and Coreg. Case discussed with Hospitalist Dr. Crawford.

## 2019-04-01 NOTE — ED ADULT NURSE NOTE - NSIMPLEMENTINTERV_GEN_ALL_ED
Implemented All Fall with Harm Risk Interventions:  [place] to call system. Call bell, personal items and telephone within reach. Instruct patient to call for assistance. Room bathroom lighting operational. Non-slip footwear when patient is off stretcher. Physically safe environment: no spills, clutter or unnecessary equipment. Stretcher in lowest position, wheels locked, appropriate side rails in place. Provide visual cue, wrist band, yellow gown, etc. Monitor gait and stability. Monitor for mental status changes and reorient to person, place, and time. Review medications for side effects contributing to fall risk. Reinforce activity limits and safety measures with patient and family. Provide visual clues: red socks.

## 2019-04-01 NOTE — H&P ADULT - ATTENDING COMMENTS
Briefly, 73M hx Waldenstrom macroglobinemia, Afib on Eliquis with exertional dyspnea, elevated proBNP (was previously normal), pleural effusions on imaging being admitted for new acute decompensated CHF.  Seen by cardiology who recommended trial of IV diuresis then reassessment tomorrow with possibility to transition to PO diuretic and outpatient TTE.

## 2019-04-01 NOTE — ED STATDOCS - PROGRESS NOTE DETAILS
74 y/o M pt with hx of A-fib, pacemaker, CA, DVT last month presents to ED c/o SOB, sent from cardiologist for further eval. Pt will be sent to Main ED for further cardiac workup secondary to PMHx and presentation.

## 2019-04-01 NOTE — ED ADULT NURSE REASSESSMENT NOTE - NS ED NURSE REASSESS COMMENT FT1
resident called for PRN sleep medication that patient takes. will order.
pt resting comfortably, in no acute distress. Respirations are even and unlabored. pt voices no complaints at this time. pt and family updated and aware of plan of care. will cont to monitor.

## 2019-04-01 NOTE — CONSULT NOTE ADULT - REASON FOR ADMISSION
Dyspnea secondary to bilateral pleural effusion due to diastolic HF exacerbation and A.fib possibly from Imbruvica

## 2019-04-01 NOTE — CONSULT NOTE ADULT - REASON FOR ADMISSION
CHF conducted a detailed discussion... I had a detailed discussion with the patient and/or guardian regarding the historical points, exam findings, and any diagnostic results supporting the discharge/admit diagnosis.

## 2019-04-02 ENCOUNTER — TRANSCRIPTION ENCOUNTER (OUTPATIENT)
Age: 74
End: 2019-04-02

## 2019-04-02 VITALS
TEMPERATURE: 97 F | HEART RATE: 62 BPM | RESPIRATION RATE: 18 BRPM | SYSTOLIC BLOOD PRESSURE: 106 MMHG | DIASTOLIC BLOOD PRESSURE: 61 MMHG | OXYGEN SATURATION: 94 %

## 2019-04-02 DIAGNOSIS — I48.91 UNSPECIFIED ATRIAL FIBRILLATION: ICD-10-CM

## 2019-04-02 DIAGNOSIS — D53.9 NUTRITIONAL ANEMIA, UNSPECIFIED: ICD-10-CM

## 2019-04-02 DIAGNOSIS — C88.0 WALDENSTROM MACROGLOBULINEMIA: ICD-10-CM

## 2019-04-02 PROBLEM — C95.90 LEUKEMIA, UNSPECIFIED NOT HAVING ACHIEVED REMISSION: Chronic | Status: INACTIVE | Noted: 2019-02-28 | Resolved: 2019-04-01

## 2019-04-02 PROBLEM — C85.90 NON-HODGKIN LYMPHOMA, UNSPECIFIED, UNSPECIFIED SITE: Chronic | Status: INACTIVE | Noted: 2017-05-04 | Resolved: 2019-04-01

## 2019-04-02 LAB
FERRITIN SERPL-MCNC: 150 NG/ML — SIGNIFICANT CHANGE UP (ref 30–400)
FOLATE SERPL-MCNC: >20 NG/ML — SIGNIFICANT CHANGE UP
HCV AB S/CO SERPL IA: 0.04 S/CO — SIGNIFICANT CHANGE UP (ref 0–0.79)
HCV AB SERPL-IMP: SIGNIFICANT CHANGE UP
IRON SATN MFR SERPL: 11 % — LOW (ref 16–55)
IRON SATN MFR SERPL: 51 UG/DL — LOW (ref 59–158)
TIBC SERPL-MCNC: 446 UG/DL — HIGH (ref 220–430)
TRANSFERRIN SERPL-MCNC: 312 MG/DL — SIGNIFICANT CHANGE UP (ref 180–329)
TROPONIN T SERPL-MCNC: <0.01 NG/ML — SIGNIFICANT CHANGE UP (ref 0–0.06)
VIT B12 SERPL-MCNC: 436 PG/ML — SIGNIFICANT CHANGE UP (ref 232–1245)

## 2019-04-02 PROCEDURE — 83880 ASSAY OF NATRIURETIC PEPTIDE: CPT

## 2019-04-02 PROCEDURE — 93005 ELECTROCARDIOGRAM TRACING: CPT

## 2019-04-02 PROCEDURE — 99239 HOSP IP/OBS DSCHRG MGMT >30: CPT

## 2019-04-02 PROCEDURE — 84466 ASSAY OF TRANSFERRIN: CPT

## 2019-04-02 PROCEDURE — 86803 HEPATITIS C AB TEST: CPT

## 2019-04-02 PROCEDURE — 36415 COLL VENOUS BLD VENIPUNCTURE: CPT

## 2019-04-02 PROCEDURE — 85027 COMPLETE CBC AUTOMATED: CPT

## 2019-04-02 PROCEDURE — 96374 THER/PROPH/DIAG INJ IV PUSH: CPT | Mod: XU

## 2019-04-02 PROCEDURE — 82607 VITAMIN B-12: CPT

## 2019-04-02 PROCEDURE — 82746 ASSAY OF FOLIC ACID SERUM: CPT

## 2019-04-02 PROCEDURE — 71275 CT ANGIOGRAPHY CHEST: CPT

## 2019-04-02 PROCEDURE — 84484 ASSAY OF TROPONIN QUANT: CPT

## 2019-04-02 PROCEDURE — 83550 IRON BINDING TEST: CPT

## 2019-04-02 PROCEDURE — 71046 X-RAY EXAM CHEST 2 VIEWS: CPT

## 2019-04-02 PROCEDURE — 80053 COMPREHEN METABOLIC PANEL: CPT

## 2019-04-02 PROCEDURE — 99285 EMERGENCY DEPT VISIT HI MDM: CPT | Mod: 25

## 2019-04-02 PROCEDURE — 83540 ASSAY OF IRON: CPT

## 2019-04-02 PROCEDURE — 82728 ASSAY OF FERRITIN: CPT

## 2019-04-02 RX ADMIN — CARVEDILOL PHOSPHATE 3.12 MILLIGRAM(S): 80 CAPSULE, EXTENDED RELEASE ORAL at 05:14

## 2019-04-02 RX ADMIN — APIXABAN 5 MILLIGRAM(S): 2.5 TABLET, FILM COATED ORAL at 05:14

## 2019-04-02 RX ADMIN — Medication 40 MILLIGRAM(S): at 05:14

## 2019-04-02 RX ADMIN — GABAPENTIN 300 MILLIGRAM(S): 400 CAPSULE ORAL at 05:14

## 2019-04-02 NOTE — DISCHARGE NOTE PROVIDER - CARE PROVIDER_API CALL
Rhonda Couch)  Hematology; Internal Medicine; Oncology  [address] [place] [address]  Phone: [phone]  Fax: [phone]  Follow Up Time:     Isidoro Brush)  Cardiovascular Disease; Internal Medicine; Nuclear Cardiology  [address] [place] [address]  Phone: [phone]  Fax: [phone]  Follow Up Time: Rhonda Couch)  Hematology; Internal Medicine; Oncology  [address] [place] [address]  Phone: [phone]  Fax: [phone]  Follow Up Time: 2 weeks    Isidoro Brush)  Cardiovascular Disease; Internal Medicine; Nuclear Cardiology  [address] [place] [address]  Phone: [phone]  Fax: [phone]  Follow Up Time: 1-3 days    osman brady  primary care doctor  Phone: [phone]  Fax: (   )    -  Follow Up Time: 1 week

## 2019-04-02 NOTE — DISCHARGE NOTE PROVIDER - PROVIDER TOKENS
PROVIDER:[TOKEN:[[id number]]],PROVIDER:[TOKEN:[[id number]]] PROVIDER:[TOKEN:[[id number]],FOLLOWUP:[2 weeks]],PROVIDER:[TOKEN:[[id number]],FOLLOWUP:[1-3 days]],FREE:[LAST:[caitlyn],FIRST:[osmna],PHONE:[phone]],FAX:[(   [phone]    -],ADDRESS:[primary care doctor],FOLLOWUP:[1 week]]

## 2019-04-02 NOTE — DISCHARGE NOTE PROVIDER - NSDCFUADDINST_GEN_ALL_CORE_FT
CT Chest:  FINDINGS:  LUNGS AND LARGE AIRWAYS: Emphysematous changes in the upper lungs.   Increased Subpleural reticular and groundglass opacities at the lung   bases,  . Similar Left lower lobe opacity, likely linear   atelectasis or scarring. Secretions are seen in the trachea. Mild   bronchial   wall thickening, likely reactive airways disease or bronchitis.   PLEURA: Increased Mild bilateral pleural effusions.     VESSELS: NO EVIDENCE OF PULMONARY EMBOLISM.  The thoracic aorta is normal in caliber with mild calcified   plaque. There are coronary artery calcifications.   HEART: There is mild cardiomegaly... No pericardial effusion.   MEDIASTINUM AND MAGGIE: No lymphadenopathy.   CHEST WALL AND LOWER NECK: No enlargedaxillary lymph nodes. Left chest   wall   pacemaker with leads in place.   VISUALIZED UPPER ABDOMEN: Liver cysts measuring up to 2.9 cm. Partially   imaged low-attenuation lesion in the right kidney, likely a cyst. There   is   narrowing of the proximal celiac artery, likely secondary to arcuate   limiting compression, unchanged. Partially imaged mild noncalcified   plaque   at the origin of the superior mesenteric artery. Punctate high   attenuation   foci in the gallbladder, likely gallstones.   BONES: Sclerotic lesions in the T2 and T8 vertebral bodies, new,   suspicious   metastatic disease.     IMPRESSION:      No evidence of pulmonary embolus..    Mild cardiomegaly without pericardial effusion.   Mild bronchial wall thickening, likely reactive airways disease or   bronchitis.    Increased bilateral pleural effusions  Increased bilateral lower lobe reticular opacities and groundglass   opacities as described .   New sclerotic lesions in the thoracic spine suspicious for metastatic   disease; a bone scan is recommended for further evaluation.     Sclerotic bone lesions unchanged..  Summation of findings may indicate them a pulmonary edema of cardiac or   noncardiac origin. Superimposed infectious pneumonia should be considered   as well.    < end of copied text >

## 2019-04-02 NOTE — DISCHARGE NOTE PROVIDER - NSDCCPCAREPLAN_GEN_ALL_CORE_FT
PRINCIPAL DISCHARGE DIAGNOSIS  Diagnosis: Congestive heart failure, unspecified HF chronicity, unspecified heart failure type  Assessment and Plan of Treatment: Pt with acute on chronic CHF with increased BNP. Will need TTE outpatient with cardiologist. Strict i's and o's recommended with daily weigth. Please return to E.D if notice weight gain of more than 3 pounds while on meds. Please follow up with cardiologist Dr. Kuhn within 1 week to determine need for med adjustment.      SECONDARY DISCHARGE DIAGNOSES  Diagnosis: Bone lesion  Assessment and Plan of Treatment: You have sclerotic bone lesion with concern for metastatasis. You will need bscan which will need to be done outpatient upon discharge. Please follow up with your oncologist Dr. Couch on appointment already made for April 17th for further informatio on diagnostic and treatment plan.    Diagnosis: Afib  Assessment and Plan of Treatment: You have afib with CHADVASC of 3 and currently on anticoagulation with Eliquis. Please continue meds as prescribed and follow up with your cardiologist and PMD. Return to PMD if you notice any signs of bleed.    Diagnosis: Macrocytic anemia  Assessment and Plan of Treatment: You have macrocytic anemia with lastb hemoglobin/hematocrit of 10.2/32.2. Please follow up with your Hematologist for further recommendation. Please notify your provider if you notice any blood loss or black stool as this can be sign of worsening aenmia    Diagnosis: Waldenstrom macroglobulinemia  Assessment and Plan of Treatment: You are already treated with Imbruvica as per Hematology/oncology. Please continue current management anf follow up with HEm/Onc Dr. Couch on April 17th already made. PRINCIPAL DISCHARGE DIAGNOSIS  Diagnosis: Congestive heart failure, unspecified HF chronicity, unspecified heart failure type  Assessment and Plan of Treatment: Pt with acute on chronic CHF with increased BNP. Will need TTE outpatient with cardiologist. Strict i's and o's recommended with daily weigt. Please return to E.D if notice weight gain of more than 3 pounds while on meds. Please follow up with cardiologist Dr. Kuhn within 1 week to determine need for med adjustment.      SECONDARY DISCHARGE DIAGNOSES  Diagnosis: Waldenstrom macroglobulinemia  Assessment and Plan of Treatment: You are already treated with Imbruvica as per Hematology/oncology. Please continue current management anf follow up with HEm/Onc Dr. Couch on April 17th already made. Be sure to discuss with them that you were noted to have lesions on your thoracic spine and a bone scan was recommended.    Diagnosis: Macrocytic anemia  Assessment and Plan of Treatment: You have macrocytic anemia with lastb hemoglobin/hematocrit of 10.2/32.2. Please follow up with your Hematologist for further recommendation. Please notify your provider if you notice any blood loss or black stool as this can be sign of worsening aenmia    Diagnosis: Afib  Assessment and Plan of Treatment: You have afib with CHADVASC of 3 and currently on anticoagulation with Eliquis. Please continue meds as prescribed and follow up with your cardiologist and PMD. Return to PMD if you notice any signs of bleed.    Diagnosis: Bone lesion  Assessment and Plan of Treatment: You have sclerotic bone lesion with concern for metastatasis. You will need bscan which will need to be done outpatient upon discharge. Please follow up with your oncologist Dr. Couch on appointment already made for April 17th for further informatio on diagnostic and treatment plan.

## 2019-04-02 NOTE — PROGRESS NOTE ADULT - SUBJECTIVE AND OBJECTIVE BOX
CC: Patient is a 73y old  Male who presents with a chief complaint of Dyspnea secondary to bilateral pleural effusion due to diastolic HF exacerbation and A.fib possibly from Imbruvica (01 Apr 2019 22:42)    Patient seen and examined at bedside, No acute overnight events. Pt reports significant improvement in SOB.   Patient ambulating, eating well, voiding and last BM prior to admission.  Cardiac monitor reviewed;    ROS: Denies fever, chest pain, SOB, abdominal pain, diarrhea, constipation, calf pain.    VS:   Vital Signs Last 24 Hrs  T(C): 36.8 (01 Apr 2019 23:44), Max: 36.8 (01 Apr 2019 23:44)  T(F): 98.2 (01 Apr 2019 23:44), Max: 98.2 (01 Apr 2019 23:44)  HR: 102 (01 Apr 2019 23:44) (64 - 102)  BP: 154/92 (01 Apr 2019 23:44) (139/68 - 167/79)  RR: 19 (01 Apr 2019 23:44) (18 - 20)  SpO2: 98% (01 Apr 2019 23:44) (91% - 98%) on room air    Physical Exam:   Gen: NAD, elderly male, speaking full sentences  HEENT: NCAT, EOMI, PERRLA  CVS: RRR, +S1/S2, no murmurs, rubs or gallops appreciated  Lungs: CTAB, no wheeze, rales, rhonchi, normal WOB  Abdomen: +BS, soft, ND, NT. no palpable flank tenderness or mass  Ext: No cyanosis or calf tenderness, 1+b/l pitting edema  Neuro: AAOx3, no focal deficits.    Labs:                        10.2   4.7   )-----------( 162      ( 01 Apr 2019 12:14 )             32.2   04-01    138  |  104  |  14.0  ----------------------------<  124<H>  5.2   |  22.0  |  0.94    Ca    8.9      01 Apr 2019 12:14    TPro  5.9<L>  /  Alb  3.9  /  TBili  0.8  /  DBili  x   /  AST  18  /  ALT  12  /  AlkPhos  92  04-01 04-01 @ 07:01  -  04-02 @ 05:10  --------------------------------------------------------  IN: 0 mL / OUT: 1175 mL / NET: -1175 mL        Radiology:  < from: CT Angio Chest w/ IV Cont (04.01.19 @ 17:35) >   No evidence of pulmonary embolus..    Mild cardiomegaly without pericardial effusion.   Mild bronchial wall thickening, likely reactive airways disease or   bronchitis.    Increased bilateral pleural effusions  Increased bilateral lower lobe reticular opacities and groundglass   opacities as described .   New sclerotic lesions in the thoracic spine suspicious for metastatic   disease; a bone scan is recommended for further evaluation.     Sclerotic bone lesions unchanged..  Summation of findings may indicate them a pulmonary edema of cardiac or   noncardiac origin. Superimposed infectious pneumonia should be considered   as well.    < end of copied text >      Medications:  MEDICATIONS  (STANDING):  apixaban 5 milliGRAM(s) Oral every 12 hours  carvedilol 3.125 milliGRAM(s) Oral every 12 hours  folic acid 1 milliGRAM(s) Oral daily  furosemide   Injectable 40 milliGRAM(s) IV Push two times a day  gabapentin 300 milliGRAM(s) Oral three times a day  ibrutinib 140 milliGRAM(s) Oral two times a day    MEDICATIONS  (PRN):  zolpidem 5 milliGRAM(s) Oral at bedtime PRN Insomnia

## 2019-04-02 NOTE — DISCHARGE NOTE NURSING/CASE MANAGEMENT/SOCIAL WORK - NSDCPEPT PROEDHF_GEN_ALL_CORE
Activities as tolerated/Low salt diet/Monitor weight daily/Call primary care provider for follow up after discharge/Report signs and symptoms to primary care provider

## 2019-04-02 NOTE — PROGRESS NOTE ADULT - SUBJECTIVE AND OBJECTIVE BOX
Cardiology Follow-up    MARCELO BRYANT was seen and examined. No events overnight. The patient denies any chest pain, SOB, palpitations, orthopnea, PND or abdominal pain.He has been diuresing and his shortness of breath is improved.    PROBLEM LIST:    PAST MEDICAL HISTORY:  Waldenstrom macroglobulinemia  Leukemia  Pacemaker  Atrial fibrillation and flutter  HTN (hypertension)  Lymphoma    PAST SURGICAL HISTORY:  No significant past surgical history    MEDICATIONS  (STANDING):  apixaban 5 milliGRAM(s) Oral every 12 hours  carvedilol 3.125 milliGRAM(s) Oral every 12 hours  folic acid 1 milliGRAM(s) Oral daily  furosemide   Injectable 40 milliGRAM(s) IV Push two times a day  gabapentin 300 milliGRAM(s) Oral three times a day  ibrutinib 140 milliGRAM(s) Oral two times a day    MEDICATIONS  (PRN):  zolpidem 5 milliGRAM(s) Oral at bedtime PRN Insomnia    ALLERGIES:  Rituxan (Hives; Rash)    PHYSICAL EXAM:  T(C): 36.1 (19 @ 07:25), Max: 36.8 (19 @ 23:44)  T(F): 97 (19 @ 07:25), Max: 98.2 (19 @ 23:44)  HR: 62 (19 @ 07:25) (62 - 102)  BP: 106/61 (19 @ 07:25) (106/61 - 167/79)  RR: 18 (19 @ 07:25) (17 - 20)  SpO2: 94% (19 @ 07:25) (91% - 98%)  Wt(kg): --  I&O's Summary    2019 07:01  -  2019 07:00  --------------------------------------------------------  IN: 0 mL / OUT: 1175 mL / NET: -1175 mL      Telemetry: atrial fibrillation  General: comfortable, in NAD  Heart: +S1S2, RRR, 1/6 systolic murmur at the LSB, no rubs, no gallops  Lungs: clear to auscultation bilaterally, no wheezes, no rales, no rhonchi  Abdomen: soft, non-tender, non-distended, + bowel sounds  Extremities: no clubbing, no cyanosis, no edema  Neuro: A&O x3    LABS:  Serum Pro-Brain Natriuretic Peptide: 1002 pg/mL ( @ 12:14)    Troponin T, Serum: <0.01 ng/mL ( @ 02:21)  Troponin T, Serum: <0.01 ng/mL ( @ 12:14)                            10.2   4.7   )-----------( 162      ( 2019 12:14 )             32.2         138  |  104  |  14.0  ----------------------------<  124<H>  5.2   |  22.0  |  0.94    Ca    8.9      2019 12:14    TPro  5.9<L>  /  Alb  3.9  /  TBili  0.8  /  DBili  x   /  AST  18  /  ALT  12  /  AlkPhos  92  04-      RADIOLOGY & ADDITIONAL TESTS:    ASSESSMENT:  MARCELO BRYANT is a 73y old male with a history of paroxysmal atrial flutter,s/p ablation,essential hypertension,[employer] pacemaker,lymphoma who presented with shortness of breath.He had acute diastolic CHF which is compensated.    Please permit me to suggest the followin. He is cleared for discharge from a cardiac standpoint.Would discharge on Torsemide 10 mg daily and Eliquis 5 mg bid for atrial fibrillation  2.Follow up in office

## 2019-04-02 NOTE — DISCHARGE NOTE PROVIDER - HOSPITAL COURSE
Mr. 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 Pt started on Lasiv IV BID.     Pt seen by cardiologist Dr. Brush and cleared for discharge from cardiac standpoint on Torsemide. He will follow up with cardiologist within a week on discharge. Pt noted to have sclerotic bone lesions with leukopenia concerning for metastatic disease. He will need follow up with hem/onc and will need additional bone scan. Plan discussed with patient and wife at bedside.         Time spent discharge planning- 45 minutes. Mr. 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 Pt started on Lasiv IV BID with plan to transition to torsemide.    Pt seen by cardiologist Dr. Brush and cleared for discharge from cardiac standpoint on Torsemide. He will follow up with cardiologist within a week on discharge. Pt noted to have sclerotic bone lesions with leukopenia concerning for metastatic disease. He will need follow up with hem/onc and will need additional bone scan. Plan discussed with patient and wife at bedside.         Time spent discharge planning- 45 minutes. Mr. 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 Noted to have pleural effusion on CT, pro-BNP of 1002, LE edema concerning for acute diastolic CHF likely 2/2 brief ep of uncontrolled AFib w/ RVR per cardio. Pt started on Lasiv IV BID with improvement of symptoms.     Pt seen by cardiologist, determination made that no need for continued inpatient diuresis, appointment established for inpatient follow up on day post discharge for in office echo and continuation of oral diuretic.cleared for discharge from cardiac standpoint.         Of note, Pt noted to have sclerotic bone lesions with leukopenia concerning for metastatic disease, specifically on t2 + t8 on imaging . He will need follow up with hem/onc and will need additional bone scan. appointment already scheduled in 2wks. Plan discussed with patient and wife at bedside.         All electrolyte abnormalities were monitored carefully and repleted as necessary during this hospitalization. At the time of discharge patient was hemodynamically stable and amenable to all terms of discharge. The patient has received verbal instructions from myself regarding discharge plans.         Length of Discharge: 45MIN        Vital Signs Last 24 Hrs    T(C): 36.1 (02 Apr 2019 07:25), Max: 36.8 (01 Apr 2019 23:44)    T(F): 97 (02 Apr 2019 07:25), Max: 98.2 (01 Apr 2019 23:44)    HR: 62 (02 Apr 2019 07:25) (62 - 102)    BP: 106/61 (02 Apr 2019 07:25) (106/61 - 167/79)    BP(mean): 95 (02 Apr 2019 05:12) (95 - 95)    RR: 18 (02 Apr 2019 07:25) (17 - 20)    SpO2: 94% (02 Apr 2019 07:25) (91% - 98%)        PHYSICAL EXAM.        GEN - appears age appropriate. well nourished. pleasant. no distress.     HEENT - NCAT, EOMI, CONG    RESP - CTA BL, no wheeze/stridor/rhonchi/crackles. not on supplemental O2.    CARDIO - NS1S2, RRR. No murmurs/rubs/gallops.    ABD - Soft/Non tender/Non distended. Normal BS x4 quadrants.     Ext - trace ALVAREZ on LT side    MSK - BL 5/5 strength on upper and lower extremities.     Neuro - AAOx3.         PMD contacted and made aware of hospital discharge. Follow up appointment advised within 7 days of discharge.

## 2019-05-21 NOTE — ED PROVIDER NOTE - NSCAREINITIATED _GEN_ER
OUTPATIENT PROGRESS NOTE - ONCOLOGY    CHIEF COMPLAINT  Chief Complaint   Patient presents with   • Follow-up         HEMATOLOGIC/ONCOLOGIC HISTORY  Adenocarcinoma of left breast (174.9) (C50.912); Left breast infiltrating ductal carcinoma grade 2, upper outer quadrant, %, [place] 80%, Ki-67 35%, HER-2 amplified, CT2 pN1 M0, Stage IIB  Currently stage IV        INTERVAL HEMATOLOGIC/ONCOLOGIC HISTORY  12/13/2016  Qing came alone for follow up today  she stated that she was in good health  She did not take the antiestrogen due to concern about side effects  she also did not followup with breast surgery (she last saw them in April of this year)  She had an excellent performance status and continues to look after her family without any trouble  She denied any symptoms or signs of metastatic disease    3/19/2019 Dewey the patient came alone.  She stated that she was doing reasonably well.  She noticed enlargement of the left breast and came in for a follow-up.  She has chosen not been treated in the interim including with antiestrogens as discussed with her at the last visit.  She has been in touch with the nurse navigator.    4/16/2019  The patient returns for follow-up.  She is undergoing brain radiation therapy at this time.  She was accompanied by 2 sisters who came to help her through the proposed a treatment plan.  She did not take tamoxifen because she was concerned about side effect.  She had no specific complaints today.    5/21/2019  The patient arrived with her sister and reported that she was feeling well overall.  She continues to dress the left breast with a pressure bandage and clean up supplies with the help of her sisters.  She continues to take daily tamoxifen.  Chemotherapy approval was awaited.      MEDICATIONS  Medications were reviewed and updated today.      HISTORIES  I have personally reviewed and updated the following EMR sections: Current medications, Allergies, Problem list, Past  Medical History, Past Surgical History, Social History and Family History      REVIEW OF SYSTEMS  Review of Systems   Constitutional: Negative for activity change, appetite change, chills, diaphoresis, fatigue, fever and unexpected weight change.   HENT: Negative for congestion, facial swelling, hearing loss, nosebleeds, sore throat, trouble swallowing and voice change.    Eyes: Negative for visual disturbance.   Respiratory: Negative for apnea, choking, chest tightness, shortness of breath, wheezing and stridor.    Cardiovascular: Negative for chest pain and palpitations.   Gastrointestinal: Negative for abdominal distention, abdominal pain, anal bleeding, blood in stool, constipation, diarrhea, nausea, rectal pain and vomiting.   Genitourinary: Negative for difficulty urinating, hematuria, menstrual problem, pelvic pain, vaginal bleeding, vaginal discharge and vaginal pain.   Musculoskeletal: Negative for arthralgias, back pain, gait problem, joint swelling, myalgias and neck stiffness.   Neurological: Negative for dizziness, tremors, seizures, facial asymmetry, speech difficulty, weakness and headaches.   Hematological: Negative for adenopathy. Does not bruise/bleed easily.   Psychiatric/Behavioral: Negative for behavioral problems.         SCREENING  Psychosocial Concerns Addressed:   Depression   Anxiety   Distress   Emotional Status   Socioeconomic Status   Family Support   Cultural Background       PAIN  0    ECOG  0    PHYSICAL EXAM  There were no vitals taken for this visit.  Physical Exam   Constitutional: She is oriented to person, place, and time. She appears well-developed and well-nourished. No distress.   HENT:   Mouth/Throat: Oropharynx is clear and moist. No oropharyngeal exudate.   Eyes: Conjunctivae and EOM are normal. Right eye exhibits no discharge. Left eye exhibits no discharge. No scleral icterus.   Neck: Normal range of motion. Neck supple. No tracheal deviation present. No thyromegaly  present.   Cardiovascular: Normal rate, regular rhythm and normal heart sounds.   Pulmonary/Chest: Effort normal and breath sounds normal. No stridor. No respiratory distress. She has no wheezes. She has no rales.       Abdominal: Soft. Bowel sounds are normal. She exhibits no distension and no mass. There is no tenderness.   Musculoskeletal: Normal range of motion. She exhibits no edema or tenderness.   Lymphadenopathy:     She has no cervical adenopathy.   Neurological: She is alert and oriented to person, place, and time. No cranial nerve deficit.   Skin: Skin is warm and dry. No rash noted. No erythema. No pallor.   Psychiatric: She has a normal mood and affect.         LABORATORY  I have reviewed the pertinent laboratory tests.       PATHOLOGY  I have reviewed the pertinent pathology reports.      IMAGING  I have reviewed the pertinent imaging study reports.       ASSESSMENT/PLAN  Problem List Items Addressed This Visit        Oncologic    Malignant neoplasm of upper-outer quadrant of left breast in female, estrogen receptor positive (CMS/HCC) - Primary      I had a detailed 15-20 minute discussion with the patient and we reviewed her clinical history from diagnosis to date  The patient continues to take herbal treatments despite the fact that she realizes that the tumor has grown in the meantime. She did not take the antiestrogen therapy as prescribed and as promised by her basing her decision on perceived side effects of the treatment. I told her that if she waits too long the tumor will continue to grow and spread and it will be even more difficult to take care of it and she stated it she understands that but continues to be afraid of treatment related toxicity.  I ordered a repeat PET/CT scan to evaluate her clinically  and asked her to return in 3-4 weeks for further discussions: she did not commit to accepting any treatment at that time  she understood that this malignancy could progress and ultimately  cause her demise    3/19/2019  I had a detailed discussion with the patient.  I told her that as expected, her mass has grown substantially in the interim and has not been treated.  I told her that I feel it might be metastatic and ordered a PET CT scan and an MRI of the brain.  I called the  and got her in touch with the breast nurse navigator.  We discussed antiestrogen therapy and she agreed to take tamoxifen since she is still having periods.  I asked her to return in 4-6 weeks for a follow-up and to follow-up with her PCP in the meantime.     4/16/2019  I had a detailed discussion with the patient and her sisters.  We discussed treatment of HER-2 positive stage IV breast cancer.  We went through national conference of cancer network guidelines and discussed the treatment plan with them.  She will need systemic therapy with dual anti-HER-2 treatment along with docetaxel and carboplatin.  She signed an informed consent, she will be given chemotherapy teaching.  This will be given with palliative intent.  This will continue until progression or unacceptable toxicity.  She will be given maximal antinausea therapy and growth factor support per guidelines.  She will also receive monthly zoledronic acid, this was ordered.  She will be referred to cardio-oncology and will undergo cardiac evaluation at approximately 3 monthly intervals.  She declined an antidepressant.  We discussed a healthy protein rich and fat rich diet.  She was also interested in using aromatherapy and other kinds of nontraditional treatments.  She was told that we have no data on that.  She will return in 2-3 weeks and will follow up with her PCP as needed.    5/21/2019  I reviewed her history and had a detailed discussion with her about ongoing management plans.  We will await chemotherapy approval.  She will continue to take tamoxifen until she starts chemotherapy at which time it will be stopped since it should not be given  concomitantly with chemotherapy.  She continues to take care of her wound at home with the help of her family.  I enticed her to start eating better.  She will follow-up with radiation oncology this Thursday.  I asked her to return to the office in 2 to 3 weeks or earlier if necessary.           Kvng Galaviz(Attending)

## 2019-10-02 ENCOUNTER — APPOINTMENT (OUTPATIENT)
Dept: INTERNAL MEDICINE | Facility: CLINIC | Age: 74
End: 2019-10-02
Payer: MEDICARE

## 2019-10-02 VITALS
BODY MASS INDEX: 26.36 KG/M2 | WEIGHT: 178 LBS | DIASTOLIC BLOOD PRESSURE: 60 MMHG | HEIGHT: 69 IN | SYSTOLIC BLOOD PRESSURE: 115 MMHG

## 2019-10-02 DIAGNOSIS — Z85.72 PERSONAL HISTORY OF NON-HODGKIN LYMPHOMAS: ICD-10-CM

## 2019-10-02 PROCEDURE — 99205 OFFICE O/P NEW HI 60 MIN: CPT | Mod: 25

## 2019-10-02 PROCEDURE — 99358 PROLONG SERVICE W/O CONTACT: CPT

## 2019-10-02 RX ORDER — FOLIC ACID 1 MG/1
1 TABLET ORAL DAILY
Refills: 0 | Status: ACTIVE | COMMUNITY

## 2019-10-02 RX ORDER — IBRUTINIB 140 MG/1
140 CAPSULE ORAL
Refills: 0 | Status: ACTIVE | COMMUNITY

## 2019-10-02 RX ORDER — CARVEDILOL 3.12 MG/1
3.12 TABLET, FILM COATED ORAL TWICE DAILY
Refills: 0 | Status: ACTIVE | COMMUNITY

## 2019-10-02 RX ORDER — OXYCODONE 5 MG/1
5 TABLET ORAL EVERY 4 HOURS
Refills: 0 | Status: ACTIVE | COMMUNITY

## 2019-10-02 RX ORDER — TORSEMIDE 10 MG/1
10 TABLET ORAL DAILY
Refills: 0 | Status: ACTIVE | COMMUNITY

## 2019-10-02 RX ORDER — APIXABAN 5 MG/1
5 TABLET, FILM COATED ORAL
Refills: 0 | Status: ACTIVE | COMMUNITY
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
## 2019-10-02 NOTE — PHYSICAL EXAM
[General Appearance - Alert] : alert [General Appearance - In No Acute Distress] : in no acute distress [Sclera] : the sclera and conjunctiva were normal [Neck Appearance] : the appearance of the neck was normal [Respiration, Rhythm And Depth] : normal respiratory rhythm and effort [Heart Rate And Rhythm] : heart rate was normal and rhythm regular [Full Pulse] : the pedal pulses are present [Edema] : there was no peripheral edema [Bowel Sounds] : normal bowel sounds [Abdomen Soft] : soft [Abdomen Tenderness] : non-tender [Abdomen Mass (___ Cm)] : no abdominal mass palpated [Costovertebral Angle Tenderness] : no CVA tenderness [No Palpable Adenopathy] : no palpable adenopathy [Musculoskeletal - Swelling] : no joint swelling [Nail Clubbing] : no clubbing  or cyanosis of the fingernails [Motor Tone] : muscle strength and tone were normal [FreeTextEntry1] : Patient wearing brace and tender over lower thoracic and lumbar spine [Skin Color & Pigmentation] : normal skin color and pigmentation [Deep Tendon Reflexes (DTR)] : deep tendon reflexes were 2+ and symmetric [] : no rash [Sensation] : the sensory exam was normal to light touch and pinprick [No Focal Deficits] : no focal deficits [Oriented To Time, Place, And Person] : oriented to person, place, and time [Affect] : the affect was normal

## 2019-10-02 NOTE — REASON FOR VISIT
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

## 2019-10-02 NOTE — CONSULT LETTER
[Dear  ___] : Dear  [unfilled], [Consult Letter:] : I had the pleasure of evaluating your patient, [unfilled]. [Please see my note below.] : Please see my note below. [Consult Closing:] : Thank you very much for allowing me to participate in the care of this patient.  If you have any questions, please do not hesitate to contact me. [FreeTextEntry3] : Tracy Pineda\par \par Cristian Pineda MD FACP\par Diplomates American Board of Internal Medicine and Infectious Diseases\par NPP Infectious diseases and Internal medicine [facility]

## 2019-10-02 NOTE — DATA REVIEWED
[FreeTextEntry1] : Extensive medical records were reviewed both with the patient in consultative her and in the hospital or a laboratory dated to be sent prior to evaluation of the patient \par In addition extensive time was also spent in reviewing diagnostic studies.\par \par The duration of the review took 40 Minutes\par \par \par Pertinent laboratory data reveals a record of an abnormal CASSIUS with tricuspid valve and pacemaker lead vegetation. In addition blood cultures were positive for group G. beta hemolytic strep. Pansensitive. It also should be noted that new sclerotic lesions in the thoracic spine were noted on a routine chest x-ray. Patient does have a history of B-cell lymphoma and sent\par \par All of the laboratory data are pending\par \par

## 2019-10-02 NOTE — REVIEW OF SYSTEMS
[Nasal Discharge] : nasal discharge [As noted in HPI] : as noted in HPI [As Noted in HPI] : as noted in HPI [Joint Pain] : joint pain [Easy Bleeding] : a tendency for easy bleeding [Easy Bruising] : a tendency for easy bruising [Negative] : Psychiatric

## 2019-10-02 NOTE — ASSESSMENT
[FreeTextEntry1] : This is a complicated case of group G. strep endocarditis, pacemaker lead infection and possible discitis or vertebral osteomyelitis. Patient is currently completed a course of intravenous Rocephin through a PICC line in his right arm but that'll be continued for an additional minimum several weeks pending MRI of his back.\par \par There are several issues that need to be addressed here. Since the patient seems to be doing well and cleared his bacteremia it may be possible to not have to remove pacemaker leads. That'll depend on followup blood cultures at the end of therapy and followup CASSIUS when appropriate.\par The question of chronic long-term oral suppressive therapy will be entertained and if that is sole most likely be amoxicillin. It was discussed with the patient that should he need dental work in the future he will need to take clindamycin as prophylaxis\par Of more concern is the patient's severe back pain which is Out of proportion initial trauma. This is highly suspicious for a bacteremic episode either septic discitis, epidural collection or vertebral osteomyelitis. Since he has an abnormality seen on her chest x-ray of his thoracic spine both an MRI with gadolinium of his thoracic and lumbar spine will be obtained\par \par This was all discussed in great depth and length with the patient and his spouse in the room and they understand the necessity for continuing intravenous antibiotics with a PICC line in following up with therapy. Discussed also been conveyed to his cardiologist Dr. Fowler and the patient will discuss it with his neurosurgeon. Patient is to have his MRI done with pacemaker I believe turned off as long as it is MRI a compatible pacer which appears to be\par \par \par All issues regarding patient's health and medical problems have been discussed. The patient understands and concurs with the treatment plan.

## 2019-10-10 ENCOUNTER — APPOINTMENT (OUTPATIENT)
Dept: INTERNAL MEDICINE | Facility: CLINIC | Age: 74
End: 2019-10-10

## 2019-10-23 PROBLEM — M54.9 BACK PAIN: Status: ACTIVE | Noted: 2019-10-02

## 2019-10-24 ENCOUNTER — APPOINTMENT (OUTPATIENT)
Dept: INTERNAL MEDICINE | Facility: CLINIC | Age: 74
End: 2019-10-24
Payer: MEDICARE

## 2019-10-24 VITALS
HEIGHT: 69 IN | BODY MASS INDEX: 25.92 KG/M2 | WEIGHT: 175 LBS | SYSTOLIC BLOOD PRESSURE: 130 MMHG | DIASTOLIC BLOOD PRESSURE: 70 MMHG

## 2019-10-24 DIAGNOSIS — M54.9 DORSALGIA, UNSPECIFIED: ICD-10-CM

## 2019-10-24 PROCEDURE — 99215 OFFICE O/P EST HI 40 MIN: CPT

## 2019-10-24 NOTE — ASSESSMENT
[FreeTextEntry1] : Patient here in followup on week 10 of intravenous Rocephin for group G. streptococcal tricuspid valve endocarditis, pacemaker lead infection and possible vertebral spine osteomyelitis.\par Patient doing significantly better on intravenous antibiotics. It is still unclear if patient has thoracic spine or lumbar spine osteomyelitis and hopefully the MRI will be done this week as stressed to the patient.\par Patient is clearly doing significantly better and the issue will be his followup CASSIUS regarding residual vegetation on pacemaker site bleed. Patient may require lifelong amoxicillin suppressive therapy and followup blood cultures. I would defer suppressive therapy for 4 weeks to rule out recurrent bacteremia which I think will probably unlikely based on the organism isolated.\par discuss the patient in great length that he will followup in 2 weeks.\par All issues regarding patient's health and medical problems have been discussed. The patient understands and concurs with the treatment plan.

## 2019-10-24 NOTE — PHYSICAL EXAM
[General Appearance - Alert] : alert [General Appearance - In No Acute Distress] : in no acute distress [Sclera] : the sclera and conjunctiva were normal [PERRL With Normal Accommodation] : pupils were equal in size, round, reactive to light [Extraocular Movements] : extraocular movements were intact [Outer Ear] : the ears and nose were normal in appearance [Oropharynx] : the oropharynx was normal with no thrush [Neck Appearance] : the appearance of the neck was normal [Neck Cervical Mass (___cm)] : no neck mass was observed [Jugular Venous Distention Increased] : there was no jugular-venous distention [Thyroid Diffuse Enlargement] : the thyroid was not enlarged [Auscultation Breath Sounds / Voice Sounds] : lungs were clear to auscultation bilaterally [Heart Rate And Rhythm] : heart rate was normal and rhythm regular [Heart Sounds] : normal S1 and S2 [Heart Sounds Gallop] : no gallops [Murmurs] : no murmurs [Heart Sounds Pericardial Friction Rub] : no pericardial rub [Full Pulse] : the pedal pulses are present [Edema] : there was no peripheral edema [Bowel Sounds] : normal bowel sounds [Abdomen Soft] : soft [Abdomen Tenderness] : non-tender [Abdomen Mass (___ Cm)] : no abdominal mass palpated [Costovertebral Angle Tenderness] : no CVA tenderness [No Palpable Adenopathy] : no palpable adenopathy [Musculoskeletal - Swelling] : no joint swelling [Nail Clubbing] : no clubbing  or cyanosis of the fingernails [Motor Tone] : muscle strength and tone were normal [Skin Color & Pigmentation] : normal skin color and pigmentation [] : no rash [Deep Tendon Reflexes (DTR)] : deep tendon reflexes were 2+ and symmetric [No Focal Deficits] : no focal deficits [Sensation] : the sensory exam was normal to light touch and pinprick [Affect] : the affect was normal [Oriented To Time, Place, And Person] : oriented to person, place, and time
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
## 2019-10-28 ENCOUNTER — OUTPATIENT (OUTPATIENT)
Dept: OUTPATIENT SERVICES | Facility: HOSPITAL | Age: 74
LOS: 1 days | End: 2019-10-28
Payer: MEDICARE

## 2019-10-28 VITALS
SYSTOLIC BLOOD PRESSURE: 114 MMHG | TEMPERATURE: 97 F | HEIGHT: 68 IN | WEIGHT: 179.02 LBS | DIASTOLIC BLOOD PRESSURE: 69 MMHG | RESPIRATION RATE: 20 BRPM | HEART RATE: 62 BPM

## 2019-10-28 DIAGNOSIS — Z98.890 OTHER SPECIFIED POSTPROCEDURAL STATES: Chronic | ICD-10-CM

## 2019-10-28 DIAGNOSIS — I10 ESSENTIAL (PRIMARY) HYPERTENSION: ICD-10-CM

## 2019-10-28 DIAGNOSIS — M54.9 DORSALGIA, UNSPECIFIED: ICD-10-CM

## 2019-10-28 DIAGNOSIS — Z29.9 ENCOUNTER FOR PROPHYLACTIC MEASURES, UNSPECIFIED: ICD-10-CM

## 2019-10-28 DIAGNOSIS — Z95.0 PRESENCE OF CARDIAC PACEMAKER: Chronic | ICD-10-CM

## 2019-10-28 DIAGNOSIS — I48.92 UNSPECIFIED ATRIAL FLUTTER: ICD-10-CM

## 2019-10-28 DIAGNOSIS — Z01.818 ENCOUNTER FOR OTHER PREPROCEDURAL EXAMINATION: ICD-10-CM

## 2019-10-28 LAB
ANION GAP SERPL CALC-SCNC: 15 MMOL/L — SIGNIFICANT CHANGE UP (ref 5–17)
ANISOCYTOSIS BLD QL: SLIGHT — SIGNIFICANT CHANGE UP
APTT BLD: 31.8 SEC — SIGNIFICANT CHANGE UP (ref 27.5–36.3)
BASOPHILS # BLD AUTO: 0.19 K/UL — SIGNIFICANT CHANGE UP (ref 0–0.2)
BASOPHILS NFR BLD AUTO: 4.4 % — HIGH (ref 0–2)
BUN SERPL-MCNC: 17 MG/DL — SIGNIFICANT CHANGE UP (ref 8–20)
CALCIUM SERPL-MCNC: 9.1 MG/DL — SIGNIFICANT CHANGE UP (ref 8.6–10.2)
CHLORIDE SERPL-SCNC: 100 MMOL/L — SIGNIFICANT CHANGE UP (ref 98–107)
CO2 SERPL-SCNC: 29 MMOL/L — SIGNIFICANT CHANGE UP (ref 22–29)
CREAT SERPL-MCNC: 0.85 MG/DL — SIGNIFICANT CHANGE UP (ref 0.5–1.3)
EOSINOPHIL # BLD AUTO: 0 K/UL — SIGNIFICANT CHANGE UP (ref 0–0.5)
EOSINOPHIL NFR BLD AUTO: 0 % — SIGNIFICANT CHANGE UP (ref 0–6)
GIANT PLATELETS BLD QL SMEAR: PRESENT — SIGNIFICANT CHANGE UP
GLUCOSE SERPL-MCNC: 135 MG/DL — HIGH (ref 70–115)
HCT VFR BLD CALC: 35.4 % — LOW (ref 39–50)
HGB BLD-MCNC: 11.1 G/DL — LOW (ref 13–17)
INR BLD: 1.37 RATIO — HIGH (ref 0.88–1.16)
LYMPHOCYTES # BLD AUTO: 0.08 K/UL — LOW (ref 1–3.3)
LYMPHOCYTES # BLD AUTO: 1.8 % — LOW (ref 13–44)
MACROCYTES BLD QL: SIGNIFICANT CHANGE UP
MANUAL SMEAR VERIFICATION: SIGNIFICANT CHANGE UP
MCHC RBC-ENTMCNC: 31.4 GM/DL — LOW (ref 32–36)
MCHC RBC-ENTMCNC: 37.5 PG — HIGH (ref 27–34)
MCV RBC AUTO: 119.6 FL — HIGH (ref 80–100)
MICROCYTES BLD QL: SLIGHT — SIGNIFICANT CHANGE UP
MONOCYTES # BLD AUTO: 0.76 K/UL — SIGNIFICANT CHANGE UP (ref 0–0.9)
MONOCYTES NFR BLD AUTO: 17.5 % — HIGH (ref 2–14)
NEUTROPHILS # BLD AUTO: 3.3 K/UL — SIGNIFICANT CHANGE UP (ref 1.8–7.4)
NEUTROPHILS NFR BLD AUTO: 71.9 % — SIGNIFICANT CHANGE UP (ref 43–77)
NEUTS BAND # BLD: 4.4 % — SIGNIFICANT CHANGE UP (ref 0–8)
PLAT MORPH BLD: NORMAL — SIGNIFICANT CHANGE UP
PLATELET # BLD AUTO: 177 K/UL — SIGNIFICANT CHANGE UP (ref 150–400)
POLYCHROMASIA BLD QL SMEAR: SLIGHT — SIGNIFICANT CHANGE UP
POTASSIUM SERPL-MCNC: 4.1 MMOL/L — SIGNIFICANT CHANGE UP (ref 3.5–5.3)
POTASSIUM SERPL-SCNC: 4.1 MMOL/L — SIGNIFICANT CHANGE UP (ref 3.5–5.3)
PROTHROM AB SERPL-ACNC: 15.9 SEC — HIGH (ref 10–12.9)
RBC # BLD: 2.96 M/UL — LOW (ref 4.2–5.8)
RBC # FLD: 14.6 % — HIGH (ref 10.3–14.5)
RBC BLD AUTO: ABNORMAL
SODIUM SERPL-SCNC: 144 MMOL/L — SIGNIFICANT CHANGE UP (ref 135–145)
WBC # BLD: 4.32 K/UL — SIGNIFICANT CHANGE UP (ref 3.8–10.5)
WBC # FLD AUTO: 4.32 K/UL — SIGNIFICANT CHANGE UP (ref 3.8–10.5)

## 2019-10-28 PROCEDURE — G0463: CPT

## 2019-10-28 RX ORDER — IBRUTINIB 140 MG/1
1 TABLET, FILM COATED ORAL
Qty: 0 | Refills: 0 | DISCHARGE

## 2019-10-28 RX ORDER — CEFTRIAXONE 500 MG/1
0 INJECTION, POWDER, FOR SOLUTION INTRAMUSCULAR; INTRAVENOUS
Qty: 0 | Refills: 0 | DISCHARGE

## 2019-10-28 RX ORDER — APIXABAN 2.5 MG/1
1 TABLET, FILM COATED ORAL
Qty: 0 | Refills: 0 | DISCHARGE

## 2019-10-28 RX ORDER — OXYCODONE HYDROCHLORIDE 5 MG/1
1 TABLET ORAL
Qty: 0 | Refills: 0 | DISCHARGE

## 2019-10-28 RX ORDER — CARVEDILOL PHOSPHATE 80 MG/1
1 CAPSULE, EXTENDED RELEASE ORAL
Qty: 0 | Refills: 0 | DISCHARGE

## 2019-10-28 RX ORDER — ZOLPIDEM TARTRATE 10 MG/1
1 TABLET ORAL
Qty: 0 | Refills: 0 | DISCHARGE

## 2019-10-28 RX ORDER — FOLIC ACID 0.8 MG
1 TABLET ORAL
Qty: 0 | Refills: 0 | DISCHARGE

## 2019-10-28 RX ORDER — GABAPENTIN 400 MG/1
1 CAPSULE ORAL
Qty: 0 | Refills: 0 | DISCHARGE

## 2019-10-28 NOTE — H&P PST ADULT - LAB RESULTS AND INTERPRETATION
Abnormal lab reviewed today, copies faxed to PCP and surgery office, spoke to Joan in Dr. Redman's office regarding abnormal lab results.

## 2019-10-28 NOTE — H&P PST ADULT - NSICDXPASTMEDICALHX_GEN_ALL_CORE_FT
PAST MEDICAL HISTORY:  Atrial fibrillation and flutter     Dorsalgia, unspecified     HTN (hypertension)     Pacemaker     Squamous cell cancer of skin of nose     Waldenstrom macroglobulinemia On Imbruvica PAST MEDICAL HISTORY:  Dorsalgia, unspecified     HTN (hypertension)     Pacemaker     Paroxysmal atrial flutter     Squamous cell cancer of skin of nose     Waldenstrom macroglobulinemia On Imbruvica PAST MEDICAL HISTORY:  Dorsalgia, unspecified     HTN (hypertension)     SHAHNAZ (obstructive sleep apnea) No device use    Pacemaker     Paroxysmal atrial flutter     Squamous cell cancer of skin of nose     Waldenstrom macroglobulinemia On Imbruvica

## 2019-10-28 NOTE — H&P PST ADULT - HISTORY OF PRESENT ILLNESS
75 y/o male with paroxysmal atrial flutter, depression, anxiety, HTN, CHF, squamous skin cancer, Waldenstrom Macroglobulinemia, seen  today pre-op for MRI LSPINE W/WO contrast for Dorsalgia. Pt has a pacemaker intact, report a recent lower back trauma due to fall at home. Report intermittent lumbar pain that radiates to b/l hip area, requires the use of back brace and  a cane to assist with stability. " [place] [facility] [place]  with strep s/p CASSIUS showed tricuspid valve and pacemaker lead endocarditis as well as old thrombus in his left atrial appendage". Seen today for a scheduled MRI Procedure. 75 y/o male with paroxysmal atrial flutter, depression, anxiety, HTN, CHF, squamous skin cancer, Waldenstrom Macroglobulinemia, seen  today pre-op for MRI LSPINE W/WO contrast with anesthesia for Dorsalgia. Pt has a pacemaker intact, report a recent lower back trauma due to fall at home. Report intermittent lumbar pain that radiates to b/l hip area, requires the use of back brace and  a cane to assist with stability. " [place] [facility] [place]eremia  with strep s/p CASSIUS showed tricuspid valve and pacemaker lead endocarditis as well as old thrombus in his left atrial appendage". Seen today for a scheduled MRI Procedure. 73 y/o male with paroxysmal atrial flutter, depression, anxiety, HTN, SHAHNAZ( no device use)CHF, squamous skin cancer, Waldenstrom Macroglobulinemia, seen  today pre-op for MRI LSPINE W/WO contrast with anesthesia for Dorsalgia. Pt has a pacemaker intact, report a recent lower back trauma due to fall at home. Report intermittent lumbar pain that radiates to b/l hip area, requires the use of back brace and  a cane to assist with stability. " [place] [facility] [place]  with strep s/p CASSIUS showed tricuspid valve and pacemaker lead endocarditis as well as old thrombus in his left atrial appendage". Seen today for a scheduled MRI Procedure.

## 2019-10-28 NOTE — H&P PST ADULT - NSICDXFAMILYHX_GEN_ALL_CORE_FT
FAMILY HISTORY:  Father  Still living? No  FH: heart failure, Age at diagnosis: Age Unknown    Mother  Still living? Unknown  FH: heart failure, Age at diagnosis: Age Unknown

## 2019-10-28 NOTE — H&P PST ADULT - ASSESSMENT
75 y/o male with paroxysmal atrial flutter, depression, anxiety, HTN, CHF, squamous skin cancer, Waldenstrom Macroglobulinemia, seen  today pre-op for MRI LSPINE W/WO contrast for Dorsalgia. Pt has a pacemaker intact, report a recent lower back trauma due to fall at home. Report intermittent lumbar pain that radiates to b/l hip area, requires the use of back brace and  a cane to assist with stability. 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 Procedure protocol reviewed with pt today. Pt to follow-up with PCP for clearance, Cardiologist notes, echo and EKG received from cardiologist office.   CAPRINI VTE 2.0 SCORE [CLOT updated 2019]    AGE RELATED RISK FACTORS                                                       MOBILITY RELATED FACTORS  [ ] Age 41-60 years                                            (1 Point)                    [ ] Bed rest                                                        (1 Point)  [x ] Age: 61-74 years                                           (2 Points)                  [ ] Plaster cast                                                   (2 Points)  [ ] Age= 75 years                                              (3 Points)                    [ ] Bed bound for more than 72 hours                 (2 Points)    DISEASE RELATED RISK FACTORS                                               GENDER SPECIFIC FACTORS  [x ] Edema in the lower extremities                       (1 Point)              [ ] Pregnancy                                                     (1 Point)  [ ] Varicose veins                                               (1 Point)                     [ ] Post-partum < 6 weeks                                   (1 Point)             [x ] BMI > 25 Kg/m2                                            (1 Point)                     [ ] Hormonal therapy  or oral contraception          (1 Point)                 [ ] Sepsis (in the previous month)                        (1 Point)               [ ] History of pregnancy complications                 (1 point)  [ ] Pneumonia or serious lung disease                                               [ ] Unexplained or recurrent                     (1 Point)           (in the previous month)                               (1 Point)  [ ] Abnormal pulmonary function test                     (1 Point)                 SURGERY RELATED RISK FACTORS  [ ] Acute myocardial infarction                              (1 Point)               [ ]  Section                                             (1 Point)  [ ] Congestive heart failure (in the previous month)  (1 Point)      [ ] Minor surgery                                                  (1 Point)   [ ] Inflammatory bowel disease                             (1 Point)               [ ] Arthroscopic surgery                                        (2 Points)  [ ] Central venous access                                      (2 Points)                [x ] General surgery lasting more than 45 minutes (2 points)  [ ] Malignancy- Present or previous                   (2 Points)                [ ] Elective arthroplasty                                         (5 points)    [ ] Stroke (in the previous month)                          (5 Points)                                                                                                                                                           HEMATOLOGY RELATED FACTORS                                                 TRAUMA RELATED RISK FACTORS  [ ] Prior episodes of VTE                                     (3 Points)                [ ] Fracture of the hip, pelvis, or leg                       (5 Points)  [ ] Positive family history for VTE                         (3 Points)             [ ] Acute spinal cord injury (in the previous month)  (5 Points)  [ ] Prothrombin 49295 A                                     (3 Points)               [ ] Paralysis  (less than 1 month)                             (5 Points)  [ ] Factor V Leiden                                             (3 Points)                  [ ] Multiple Trauma within 1 month                        (5 Points)  [ ] Lupus anticoagulants                                     (3 Points)                                                           [ ] Anticardiolipin antibodies                               (3 Points)                                                       [ ] High homocysteine in the blood                      (3 Points)                                             [ ] Other congenital or acquired thrombophilia      (3 Points)                                                [ ] Heparin induced thrombocytopenia                  (3 Points)                                     Total Score [     6     ]  OPIOID RISK TOOL    RAFAEL EACH BOX THAT APPLIES AND ADD TOTALS AT THE END    FAMILY HISTORY OF SUBSTANCE ABUSE                 FEMALE         MALE                                                Alcohol                             [  ]1 pt          [  ]3pts                                               Illegal Durgs                     [  ]2 pts        [  ]3pts                                               Rx Drugs                           [  ]4 pts        [  ]4 pts    PERSONAL HISTORY OF SUBSTANCE ABUSE                                                                                          Alcohol                             [  ]3 pts       [  ]3 pts                                               Illegal Drugs                     [  ]4 pts        [  ]4 pts                                               Rx Drugs                           [  ]5 pts        [  ]5 pts    AGE BETWEEN 16-45 YEARS                                      [  ]1 pt         [  ]1 pt    HISTORY OF PREADOLESCENT   SEXUAL ABUSE                                                             [  ]3 pts        [  ]0pts    PSYCHOLOGICAL DISEASE                     ADD, OCD, Bipolar, Schizophrenia        [  ]2 pts         [  ]2 pts                      Depression                                               [ x ]1 pt           [  ]1 pt           SCORING TOTAL   (add numbers and type here)              (**1*)                                     A score of 3 or lower indicated LOW risk for future opioid abuse  A score of 4 to 7 indicated moderate risk for future opioid abuse  A score of 8 or higher indicates a high risk for opioid abuse 75 y/o male with paroxysmal atrial flutter, depression, anxiety, HTN, CHF, squamous skin cancer, Waldenstrom Macroglobulinemia, seen  today pre-op for MRI LSPINE W/WO contrast with anesthesia for Dorsalgia. Pt has a pacemaker intact, report a recent lower back trauma due to fall at home. Report intermittent lumbar pain that radiates to b/l hip area, requires the use of back brace and  a cane to assist with stability. " [place] [facility] [place] for bacteremia  with strep s/p CASSIUS showed tricuspid valve and pacemaker lead endocarditis as well as old thrombus in his left atrial appendage". Procedure protocol reviewed with pt today. Pt to follow-up with PCP for clearance, Cardiologist notes, echo and EKG received from cardiologist office.   CAPRINI VTE 2.0 SCORE [CLOT updated 2019]    AGE RELATED RISK FACTORS                                                       MOBILITY RELATED FACTORS  [ ] Age 41-60 years                                            (1 Point)                    [ ] Bed rest                                                        (1 Point)  [x ] Age: 61-74 years                                           (2 Points)                  [ ] Plaster cast                                                   (2 Points)  [ ] Age= 75 years                                              (3 Points)                    [ ] Bed bound for more than 72 hours                 (2 Points)    DISEASE RELATED RISK FACTORS                                               GENDER SPECIFIC FACTORS  [x ] Edema in the lower extremities                       (1 Point)              [ ] Pregnancy                                                     (1 Point)  [ ] Varicose veins                                               (1 Point)                     [ ] Post-partum < 6 weeks                                   (1 Point)             [x ] BMI > 25 Kg/m2                                            (1 Point)                     [ ] Hormonal therapy  or oral contraception          (1 Point)                 [ ] Sepsis (in the previous month)                        (1 Point)               [ ] History of pregnancy complications                 (1 point)  [ ] Pneumonia or serious lung disease                                               [ ] Unexplained or recurrent                     (1 Point)           (in the previous month)                               (1 Point)  [ ] Abnormal pulmonary function test                     (1 Point)                 SURGERY RELATED RISK FACTORS  [ ] Acute myocardial infarction                              (1 Point)               [ ]  Section                                             (1 Point)  [ ] Congestive heart failure (in the previous month)  (1 Point)      [ ] Minor surgery                                                  (1 Point)   [ ] Inflammatory bowel disease                             (1 Point)               [ ] Arthroscopic surgery                                        (2 Points)  [ ] Central venous access                                      (2 Points)                [x ] General surgery lasting more than 45 minutes (2 points)  [ ] Malignancy- Present or previous                   (2 Points)                [ ] Elective arthroplasty                                         (5 points)    [ ] Stroke (in the previous month)                          (5 Points)                                                                                                                                                           HEMATOLOGY RELATED FACTORS                                                 TRAUMA RELATED RISK FACTORS  [ ] Prior episodes of VTE                                     (3 Points)                [ ] Fracture of the hip, pelvis, or leg                       (5 Points)  [ ] Positive family history for VTE                         (3 Points)             [ ] Acute spinal cord injury (in the previous month)  (5 Points)  [ ] Prothrombin 19246 A                                     (3 Points)               [ ] Paralysis  (less than 1 month)                             (5 Points)  [ ] Factor V Leiden                                             (3 Points)                  [ ] Multiple Trauma within 1 month                        (5 Points)  [ ] Lupus anticoagulants                                     (3 Points)                                                           [ ] Anticardiolipin antibodies                               (3 Points)                                                       [ ] High homocysteine in the blood                      (3 Points)                                             [ ] Other congenital or acquired thrombophilia      (3 Points)                                                [ ] Heparin induced thrombocytopenia                  (3 Points)                                     Total Score [     6     ]  OPIOID RISK TOOL    RAFAEL EACH BOX THAT APPLIES AND ADD TOTALS AT THE END    FAMILY HISTORY OF SUBSTANCE ABUSE                 FEMALE         MALE                                                Alcohol                             [  ]1 pt          [  ]3pts                                               Illegal Durgs                     [  ]2 pts        [  ]3pts                                               Rx Drugs                           [  ]4 pts        [  ]4 pts    PERSONAL HISTORY OF SUBSTANCE ABUSE                                                                                          Alcohol                             [  ]3 pts       [  ]3 pts                                               Illegal Drugs                     [  ]4 pts        [  ]4 pts                                               Rx Drugs                           [  ]5 pts        [  ]5 pts    AGE BETWEEN 16-45 YEARS                                      [  ]1 pt         [  ]1 pt    HISTORY OF PREADOLESCENT   SEXUAL ABUSE                                                             [  ]3 pts        [  ]0pts    PSYCHOLOGICAL DISEASE                     ADD, OCD, Bipolar, Schizophrenia        [  ]2 pts         [  ]2 pts                      Depression                                               [ x ]1 pt           [  ]1 pt           SCORING TOTAL   (add numbers and type here)              (**1*)                                     A score of 3 or lower indicated LOW risk for future opioid abuse  A score of 4 to 7 indicated moderate risk for future opioid abuse  A score of 8 or higher indicates a high risk for opioid abuse

## 2019-10-28 NOTE — ASU PATIENT PROFILE, ADULT - PMH
Atrial fibrillation and flutter    Dorsalgia, unspecified    HTN (hypertension)    Pacemaker    Squamous cell cancer of skin of nose    Waldenstrom macroglobulinemia  On Imbruvica

## 2019-10-28 NOTE — H&P PST ADULT - NSICDXPROBLEM_GEN_ALL_CORE_FT
PROBLEM DIAGNOSES  Problem: Dorsalgia  Assessment and Plan: MRI L SPINE W/WO Contrast    Problem: Need for prophylactic measure  Assessment and Plan: high risk surgical team to determine prophylactic intervention     Problem: Hypertension  Assessment and Plan: f/u with PCP for clearnace PROBLEM DIAGNOSES  Problem: Paroxysmal atrial flutter  Assessment and Plan: f/u cardiologist     Problem: Dorsalgia  Assessment and Plan: MRI L SPINE W/WO Contrast with anesthesia     Problem: Need for prophylactic measure  Assessment and Plan: high risk surgical team to determine prophylactic intervention     Problem: Hypertension  Assessment and Plan: f/u with PCP for clearance

## 2019-10-28 NOTE — H&P PST ADULT - NSICDXPASTSURGICALHX_GEN_ALL_CORE_FT
PAST SURGICAL HISTORY:  Pacemaker 1/2019 PAST SURGICAL HISTORY:  History of vascular access device right arm    Pacemaker 1/2019

## 2019-10-29 ENCOUNTER — OUTPATIENT (OUTPATIENT)
Dept: OUTPATIENT SERVICES | Facility: HOSPITAL | Age: 74
LOS: 1 days | End: 2019-10-29
Payer: MEDICARE

## 2019-10-29 VITALS
RESPIRATION RATE: 16 BRPM | SYSTOLIC BLOOD PRESSURE: 130 MMHG | HEART RATE: 61 BPM | DIASTOLIC BLOOD PRESSURE: 81 MMHG | TEMPERATURE: 99 F

## 2019-10-29 DIAGNOSIS — Z95.0 PRESENCE OF CARDIAC PACEMAKER: Chronic | ICD-10-CM

## 2019-10-29 DIAGNOSIS — M54.9 DORSALGIA, UNSPECIFIED: ICD-10-CM

## 2019-10-29 DIAGNOSIS — Z98.890 OTHER SPECIFIED POSTPROCEDURAL STATES: Chronic | ICD-10-CM

## 2019-10-29 PROCEDURE — 72157 MRI CHEST SPINE W/O & W/DYE: CPT

## 2019-10-29 PROCEDURE — 72157 MRI CHEST SPINE W/O & W/DYE: CPT | Mod: 26

## 2019-10-29 PROCEDURE — 72158 MRI LUMBAR SPINE W/O & W/DYE: CPT | Mod: 26

## 2019-10-29 PROCEDURE — 72158 MRI LUMBAR SPINE W/O & W/DYE: CPT

## 2019-10-31 ENCOUNTER — APPOINTMENT (OUTPATIENT)
Dept: INTERNAL MEDICINE | Facility: CLINIC | Age: 74
End: 2019-10-31
Payer: MEDICARE

## 2019-10-31 VITALS
HEIGHT: 69 IN | WEIGHT: 177 LBS | DIASTOLIC BLOOD PRESSURE: 65 MMHG | BODY MASS INDEX: 26.22 KG/M2 | SYSTOLIC BLOOD PRESSURE: 120 MMHG

## 2019-10-31 PROBLEM — I48.92 UNSPECIFIED ATRIAL FLUTTER: Chronic | Status: ACTIVE | Noted: 2019-10-28

## 2019-10-31 PROBLEM — G47.33 OBSTRUCTIVE SLEEP APNEA (ADULT) (PEDIATRIC): Chronic | Status: ACTIVE | Noted: 2019-10-28

## 2019-10-31 PROBLEM — C44.321 SQUAMOUS CELL CARCINOMA OF SKIN OF NOSE: Chronic | Status: ACTIVE | Noted: 2019-10-28

## 2019-10-31 PROBLEM — M54.9 DORSALGIA, UNSPECIFIED: Chronic | Status: ACTIVE | Noted: 2019-10-28

## 2019-10-31 PROBLEM — C88.0 WALDENSTROM MACROGLOBULINEMIA: Chronic | Status: ACTIVE | Noted: 2019-04-01

## 2019-10-31 PROCEDURE — 99215 OFFICE O/P EST HI 40 MIN: CPT

## 2019-10-31 NOTE — ASSESSMENT
[FreeTextEntry1] : Patient clinically doing very well completing 10 weeks of antibiotic therapy for complicated group G. streptococcal sepsis. He has documented tricuspid valve endocarditis, pacemaker wire endocarditis and septic discitis with epidural collection.\par Neurologically the patient is doing well without evidence of paraparesis and clinically no evidence of cardiac decompensation. He continues to followup with both his cardiologist in pain medicine physician.\par \par Plan is to complete intravenous antibiotics in 2 weeks for a total of 12 weeks intravenous and then transitioned to oral Vantin twice a day for the next 1-2 months followed by chronic oral suppression.\par He will require a followup CASSIUS soon after completion of antibiotics to assure resolution of vegetations on the valve and pacer wire.\par \par In addition patient referred to neurosurgery today for an opinion and followup. It does not appear the patient is any sort of neurosurgical intervention should be seen in case there is any deterioration.\par \par This was all discussed with patient at great length with his wife in attendance. The understands all the issues surrounding his illness and will followup in 2 weeks [Treatment Education] : treatment education [Treatment Adherence] : treatment adherence [Rx Dose / Side Effects] : Rx dose/side effects [Risk Reduction] : risk reduction
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
## 2019-11-07 ENCOUNTER — APPOINTMENT (OUTPATIENT)
Dept: INTERNAL MEDICINE | Facility: CLINIC | Age: 74
End: 2019-11-07

## 2019-11-12 ENCOUNTER — TRANSCRIPTION ENCOUNTER (OUTPATIENT)
Age: 74
End: 2019-11-12

## 2019-11-12 PROBLEM — Z45.2 PICC (PERIPHERALLY INSERTED CENTRAL CATHETER) IN PLACE: Status: ACTIVE | Noted: 2019-10-02

## 2019-11-12 PROBLEM — Z79.2 RECEIVING INTRAVENOUS ANTIBIOTIC TREATMENT AT HOME: Status: ACTIVE | Noted: 2019-10-02

## 2019-11-12 PROBLEM — Z95.0 PRESENCE OF CARDIAC PACEMAKER: Status: ACTIVE | Noted: 2019-10-02

## 2019-11-14 ENCOUNTER — APPOINTMENT (OUTPATIENT)
Dept: INTERNAL MEDICINE | Facility: CLINIC | Age: 74
End: 2019-11-14
Payer: MEDICARE

## 2019-11-14 VITALS
HEIGHT: 69 IN | DIASTOLIC BLOOD PRESSURE: 55 MMHG | BODY MASS INDEX: 26.22 KG/M2 | WEIGHT: 177 LBS | SYSTOLIC BLOOD PRESSURE: 110 MMHG

## 2019-11-14 DIAGNOSIS — E53.8 DEFICIENCY OF OTHER SPECIFIED B GROUP VITAMINS: ICD-10-CM

## 2019-11-14 DIAGNOSIS — Z45.2 ENCOUNTER FOR ADJUSTMENT AND MANAGEMENT OF VASCULAR ACCESS DEVICE: ICD-10-CM

## 2019-11-14 DIAGNOSIS — Z79.2 LONG TERM (CURRENT) USE OF ANTIBIOTICS: ICD-10-CM

## 2019-11-14 DIAGNOSIS — Z95.0 PRESENCE OF CARDIAC PACEMAKER: ICD-10-CM

## 2019-11-14 PROCEDURE — 99215 OFFICE O/P EST HI 40 MIN: CPT | Mod: 25

## 2019-11-14 PROCEDURE — 36415 COLL VENOUS BLD VENIPUNCTURE: CPT

## 2019-11-14 RX ORDER — CYANOCOBALAMIN 1000 UG/ML
1000 INJECTION INTRAMUSCULAR; SUBCUTANEOUS
Refills: 0 | Status: ACTIVE | COMMUNITY
Start: 2019-11-14

## 2019-11-14 RX ORDER — CEFPODOXIME PROXETIL 200 MG/1
200 TABLET, FILM COATED ORAL TWICE DAILY
Qty: 180 | Refills: 2 | Status: ACTIVE | COMMUNITY
Start: 2019-11-14 | End: 1900-01-01

## 2019-11-14 NOTE — END OF VISIT
[>50% of Time Spent on Counseling for ____] : Greater than 50% of the encounter time was spent on counseling for [unfilled] [FreeTextEntry2] : Labs drawn in office\par  [Time Spent: ___ minutes] : I have spent [unfilled] minutes of face to face time with the patient
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
## 2019-11-14 NOTE — PHYSICAL EXAM
[General Appearance - Alert] : alert [General Appearance - In No Acute Distress] : in no acute distress [Sclera] : the sclera and conjunctiva were normal [PERRL With Normal Accommodation] : pupils were equal in size, round, reactive to light [Outer Ear] : the ears and nose were normal in appearance [Extraocular Movements] : extraocular movements were intact [Oropharynx] : the oropharynx was normal with no thrush [Neck Appearance] : the appearance of the neck was normal [Neck Cervical Mass (___cm)] : no neck mass was observed [Jugular Venous Distention Increased] : there was no jugular-venous distention [Thyroid Diffuse Enlargement] : the thyroid was not enlarged [Auscultation Breath Sounds / Voice Sounds] : lungs were clear to auscultation bilaterally [Heart Rate And Rhythm] : heart rate was normal and rhythm regular [Heart Sounds] : normal S1 and S2 [Heart Sounds Gallop] : no gallops [Murmurs] : no murmurs [Heart Sounds Pericardial Friction Rub] : no pericardial rub [Full Pulse] : the pedal pulses are present [Edema] : there was no peripheral edema [Bowel Sounds] : normal bowel sounds [Abdomen Soft] : soft [Abdomen Tenderness] : non-tender [Abdomen Mass (___ Cm)] : no abdominal mass palpated [Costovertebral Angle Tenderness] : no CVA tenderness [No Palpable Adenopathy] : no palpable adenopathy [Musculoskeletal - Swelling] : no joint swelling [Nail Clubbing] : no clubbing  or cyanosis of the fingernails [Motor Tone] : muscle strength and tone were normal [Skin Color & Pigmentation] : normal skin color and pigmentation [] : no rash [FreeTextEntry1] : PICC in place - to be removed [Deep Tendon Reflexes (DTR)] : deep tendon reflexes were 2+ and symmetric [Sensation] : the sensory exam was normal to light touch and pinprick [No Focal Deficits] : no focal deficits [Oriented To Time, Place, And Person] : oriented to person, place, and time [Affect] : the affect was normal

## 2019-11-14 NOTE — REASON FOR VISIT
[FreeTextEntry1] : Group G. strep endocarditis and epidural abscess [Follow-Up: _____] : a [unfilled] follow-up visit

## 2019-11-17 LAB — VIT B12 SERPL-MCNC: 562 PG/ML

## 2019-12-05 ENCOUNTER — APPOINTMENT (OUTPATIENT)
Dept: ORTHOPEDIC SURGERY | Facility: CLINIC | Age: 74
End: 2019-12-05

## 2019-12-21 PROBLEM — B95.4 GROUP G STREPTOCOCCAL INFECTION: Status: ACTIVE | Noted: 2019-10-02

## 2019-12-21 PROBLEM — G06.1 ABSCESS IN EPIDURAL SPACE OF LUMBAR SPINE: Status: ACTIVE | Noted: 2019-10-31

## 2019-12-21 PROBLEM — C61 PROSTATE CANCER: Status: ACTIVE | Noted: 2019-11-14

## 2019-12-21 PROBLEM — I36.8 ENDOCARDITIS OF TRICUSPID VALVE: Status: ACTIVE | Noted: 2019-10-02

## 2019-12-21 PROBLEM — T82.7XXA PACEMAKER INFECTION: Status: ACTIVE | Noted: 2019-10-02

## 2019-12-21 PROBLEM — I33.0 STREPTOCOCCAL ENDOCARDITIS: Status: ACTIVE | Noted: 2019-10-02

## 2019-12-21 PROBLEM — M46.46 SEPTIC DISCITIS OF LUMBAR REGION: Status: ACTIVE | Noted: 2019-10-31

## 2019-12-23 ENCOUNTER — APPOINTMENT (OUTPATIENT)
Dept: INTERNAL MEDICINE | Facility: CLINIC | Age: 74
End: 2019-12-23

## 2019-12-23 DIAGNOSIS — G06.1 INTRASPINAL ABSCESS AND GRANULOMA: ICD-10-CM

## 2019-12-23 DIAGNOSIS — I33.0 ACUTE AND SUBACUTE INFECTIVE ENDOCARDITIS: ICD-10-CM

## 2019-12-23 DIAGNOSIS — C61 MALIGNANT NEOPLASM OF PROSTATE: ICD-10-CM

## 2019-12-23 DIAGNOSIS — B95.5 ACUTE AND SUBACUTE INFECTIVE ENDOCARDITIS: ICD-10-CM

## 2019-12-23 DIAGNOSIS — I36.8 OTHER NONRHEUMATIC TRICUSPID VALVE DISORDERS: ICD-10-CM

## 2019-12-23 DIAGNOSIS — M46.46 DISCITIS, UNSPECIFIED, LUMBAR REGION: ICD-10-CM

## 2019-12-23 DIAGNOSIS — T82.7XXA INFECTION AND INFLAMMATORY REACTION DUE TO OTHER CARDIAC AND VASCULAR DEVICES, IMPLANTS AND GRAFTS, INITIAL ENCOUNTER: ICD-10-CM

## 2019-12-23 DIAGNOSIS — B95.4 OTHER STREPTOCOCCUS AS THE CAUSE OF DISEASES CLASSIFIED ELSEWHERE: ICD-10-CM

## 2019-12-24 ENCOUNTER — MOBILE ON CALL (OUTPATIENT)
Age: 74
End: 2019-12-24

## 2020-01-07 ENCOUNTER — OTHER (OUTPATIENT)
Age: 75
End: 2020-01-07

## 2020-01-17 ENCOUNTER — RX RENEWAL (OUTPATIENT)
Age: 75
End: 2020-01-17

## 2021-12-13 NOTE — DISCHARGE NOTE NURSING/CASE MANAGEMENT/SOCIAL WORK - NSDCDPATPORTLINK_GEN_ALL_CORE
Quality 431: Preventive Care And Screening: Unhealthy Alcohol Use - Screening: Patient not identified as an unhealthy alcohol user when screened for unhealthy alcohol use using a systematic screening method Quality 47: Advance Care Plan: Advance Care Planning discussed and documented; advance care plan or surrogate decision maker documented in the medical record. Quality 110: Preventive Care And Screening: Influenza Immunization: Influenza immunization was not ordered or administered, reason not given Detail Level: Detailed Quality 226: Preventive Care And Screening: Tobacco Use: Screening And Cessation Intervention: Patient screened for tobacco use and is an ex/non-smoker Quality 111:Pneumonia Vaccination Status For Older Adults: Pneumococcal Vaccination not Administered or Previously Received, Reason not Otherwise Specified Quality 130: Documentation Of Current Medications In The Medical Record: Current Medications Documented You can access the [facility] system Patient Portal, offered by [facility], by registering with the following website: [email or web address]

## 2023-09-07 NOTE — ED PROVIDER NOTE - MUSCULOSKELETAL [-], MLM
no back pain General Sunscreen Counseling: I recommended a broad spectrum sunscreen with a SPF of 30 or higher.  I explained that SPF 30 sunscreens block approximately 97 percent of the sun's harmful rays.  Sunscreens should be applied at least 15 minutes prior to expected sun exposure and then every 2 hours after that as long as sun exposure continues. If swimming or exercising sunscreen should be reapplied every 45 minutes to an hour after getting wet or sweating.  One ounce, or the equivalent of a shot glass full of sunscreen, is adequate to protect the skin not covered by a bathing suit. I also recommended a lip balm with a sunscreen as well. Sun protective clothing can be used in lieu of sunscreen but must be worn the entire time you are exposed to the sun's rays. Detail Level: Detailed

## 2024-03-13 NOTE — ED PROVIDER NOTE - NS ED ATTENDING STATEMENT MOD
[FreeTextEntry1] : 3/13/24 OC X RAY Bilateral Knee: 4 view: This scan was reviewed and interpreted by Dr. Stewart, and his findings are-  moderate medial OA
Attending Only

## 2024-05-31 NOTE — ED PROVIDER NOTE - GASTROINTESTINAL, MLM
TRANSFER - IN REPORT:    Verbal report received from PACU on Raf Yates  being received from [facility] Abel for routine post-op      Report consisted of patient's Situation, Background, Assessment and   Recommendations(SBAR).     Information from the following report(s) Nurse Handoff Report, Surgery Report, Intake/Output, and MAR was reviewed with the receiving nurse.    Opportunity for questions and clarification was provided.      Assessment completed upon patient's arrival to unit and care assumed.     Abdomen soft, non-tender, no guarding.

## 2024-10-31 NOTE — ASSESSMENT
Please see APS consult note.    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 He was referred to Dr. Greer and orthopedic evaluation\par He has successfully completed well weeks of intravenous antibiotics and will be transitioned to oral cefpodoxime twice a day indefinitely. He will require followup MRI in approximately 2 months and will need his pacemaker turned off\par \par Patient also states he is B12 deficient and was given an injection after level was drawn. He is due to followup with his oncologist regarding that\par \par Patient is referred back to his cardiologist for followup CASSIUS to ensure resolution of vegetations from his pacemaker wire. Followup blood cultures will be deferred since the patient will be on chronic oral antibiotics unless he develops a fever\par \par All laboratory data obtained on the patient was reviewed and discussed with the patient. He will continue to followup here on a monthly basis with followup MRI in the future as mentioned above. 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 He also might require lifelong oral suppressive antibiotics to pacemaker wire involvement\par \par In addition PICC line removed right arm without complications and intravenous antibiotics discontinued\par \par All issues regarding patient's health and medical problems have been discussed. The patient understands and concurs with the treatment plan.\par  [Treatment Education] : treatment education [Rx Dose / Side Effects] : Rx dose/side effects [Drug Interactions / Side Effects] : drug interactions/side effects [Anticipatory Guidance] : anticipatory guidance